# Patient Record
Sex: FEMALE | Race: BLACK OR AFRICAN AMERICAN | Employment: PART TIME | URBAN - METROPOLITAN AREA
[De-identification: names, ages, dates, MRNs, and addresses within clinical notes are randomized per-mention and may not be internally consistent; named-entity substitution may affect disease eponyms.]

---

## 2019-01-14 ENCOUNTER — OFFICE VISIT (OUTPATIENT)
Dept: FAMILY MEDICINE CLINIC | Facility: CLINIC | Age: 59
End: 2019-01-14
Payer: COMMERCIAL

## 2019-01-14 ENCOUNTER — TELEPHONE (OUTPATIENT)
Dept: FAMILY MEDICINE CLINIC | Facility: CLINIC | Age: 59
End: 2019-01-14

## 2019-01-14 VITALS
WEIGHT: 159 LBS | BODY MASS INDEX: 32.05 KG/M2 | TEMPERATURE: 98 F | DIASTOLIC BLOOD PRESSURE: 68 MMHG | OXYGEN SATURATION: 99 % | RESPIRATION RATE: 20 BRPM | HEART RATE: 68 BPM | HEIGHT: 59 IN | SYSTOLIC BLOOD PRESSURE: 108 MMHG

## 2019-01-14 DIAGNOSIS — J30.9 ALLERGIC RHINITIS, UNSPECIFIED SEASONALITY, UNSPECIFIED TRIGGER: ICD-10-CM

## 2019-01-14 DIAGNOSIS — R10.9 RIGHT FLANK PAIN: ICD-10-CM

## 2019-01-14 DIAGNOSIS — H10.13 ALLERGIC CONJUNCTIVITIS OF BOTH EYES: Primary | ICD-10-CM

## 2019-01-14 LAB
APPEARANCE: CLEAR
MULTISTIX LOT#: NORMAL NUMERIC
PH, URINE: 5.5 (ref 4.5–8)
SPECIFIC GRAVITY: 1.02 (ref 1–1.03)
URINE-COLOR: YELLOW
UROBILINOGEN,SEMI-QN: 0.2 MG/DL (ref 0–1.9)

## 2019-01-14 PROCEDURE — 99203 OFFICE O/P NEW LOW 30 MIN: CPT | Performed by: FAMILY MEDICINE

## 2019-01-14 PROCEDURE — 81003 URINALYSIS AUTO W/O SCOPE: CPT | Performed by: FAMILY MEDICINE

## 2019-01-14 PROCEDURE — 87086 URINE CULTURE/COLONY COUNT: CPT | Performed by: FAMILY MEDICINE

## 2019-01-14 RX ORDER — OLOPATADINE HYDROCHLORIDE 1 MG/ML
1 SOLUTION/ DROPS OPHTHALMIC 2 TIMES DAILY
Qty: 1 BOTTLE | Refills: 0 | Status: SHIPPED | OUTPATIENT
Start: 2019-01-14 | End: 2019-02-04

## 2019-01-14 RX ORDER — MULTIVITAMIN
1 TABLET ORAL DAILY
COMMUNITY
End: 2019-07-01

## 2019-01-14 NOTE — PROGRESS NOTES
HPI:    Patient ID: Andreas Montaño  is a 66year old female. Patient presents with:  Eye Problem  Flank Pain    HPI  Irritation of eyes. Both eyes. Look red. Itchy. No eye pain. Onset: 4 weeks  Wears glasses. No contacts.   Tx tried: visine - no reli normal. Pupils are equal, round, and reactive to light. Right eye exhibits no exudate. Left eye exhibits no exudate. Right conjunctiva is injected. Left conjunctiva is injected. Cardiovascular: Normal rate and regular rhythm. No murmur heard.   Pulmonar

## 2019-01-15 NOTE — TELEPHONE ENCOUNTER
lmtcb- which Spencer Lara should we send release form to?  [facility]  [address] [address] Internal medicine  [facility]

## 2019-02-04 DIAGNOSIS — H10.13 ALLERGIC CONJUNCTIVITIS OF BOTH EYES: ICD-10-CM

## 2019-02-04 RX ORDER — OLOPATADINE HYDROCHLORIDE 1 MG/ML
1 SOLUTION/ DROPS OPHTHALMIC 2 TIMES DAILY
Qty: 1 BOTTLE | Refills: 0 | Status: SHIPPED | OUTPATIENT
Start: 2019-02-04 | End: 2019-03-06

## 2019-02-04 NOTE — TELEPHONE ENCOUNTER
Fax failed. Called Dr. Leiva Fruit office at [phone] to obtain a different fax #. Request re-faxed to [phone].

## 2019-02-04 NOTE — TELEPHONE ENCOUNTER
Pt requesting Dr. Rudd Brightly sent another refill on her eye drops. Her eyes are not as red but still irritated. She thinks the drops are working but needs more. Rx pended.

## 2019-02-04 NOTE — TELEPHONE ENCOUNTER
Pt states the release should go to Dr. Antonio Peterson. Release faxed to them at [phone]. Requesting blood work per Dr. Maine Griffin note on 1/14/19.

## 2019-02-08 ENCOUNTER — TELEPHONE (OUTPATIENT)
Dept: FAMILY MEDICINE CLINIC | Facility: CLINIC | Age: 59
End: 2019-02-08

## 2019-02-08 NOTE — TELEPHONE ENCOUNTER
LMTCB  ----------------------------------------------------------------------  We sent a consent to obtain MR from [facility] at [facility]. RECORDS FROM [employer].     Response from them:  Lab work was ordered for LIPID P

## 2019-02-25 NOTE — TELEPHONE ENCOUNTER
Patient states she most likely did not have the labs done. Offered to schedule an appointment for labs here patient declined at this time.

## 2019-03-13 ENCOUNTER — TELEPHONE (OUTPATIENT)
Dept: FAMILY MEDICINE CLINIC | Facility: CLINIC | Age: 59
End: 2019-03-13

## 2019-03-25 ENCOUNTER — OFFICE VISIT (OUTPATIENT)
Dept: FAMILY MEDICINE CLINIC | Facility: CLINIC | Age: 59
End: 2019-03-25
Payer: COMMERCIAL

## 2019-03-25 ENCOUNTER — HOSPITAL ENCOUNTER (OUTPATIENT)
Dept: GENERAL RADIOLOGY | Age: 59
Discharge: HOME | End: 2019-03-25
Attending: FAMILY MEDICINE
Payer: COMMERCIAL

## 2019-03-25 VITALS
SYSTOLIC BLOOD PRESSURE: 114 MMHG | HEIGHT: 59 IN | OXYGEN SATURATION: 99 % | TEMPERATURE: 99 F | RESPIRATION RATE: 16 BRPM | DIASTOLIC BLOOD PRESSURE: 78 MMHG | BODY MASS INDEX: 31.85 KG/M2 | WEIGHT: 158 LBS | HEART RATE: 79 BPM

## 2019-03-25 DIAGNOSIS — M25.562 LEFT KNEE PAIN, UNSPECIFIED CHRONICITY: Primary | ICD-10-CM

## 2019-03-25 DIAGNOSIS — M25.562 LEFT KNEE PAIN, UNSPECIFIED CHRONICITY: ICD-10-CM

## 2019-03-25 PROCEDURE — 73560 X-RAY EXAM OF KNEE 1 OR 2: CPT | Performed by: FAMILY MEDICINE

## 2019-03-25 PROCEDURE — 99214 OFFICE O/P EST MOD 30 MIN: CPT | Performed by: FAMILY MEDICINE

## 2019-03-25 RX ORDER — NAPROXEN 500 MG/1
TABLET ORAL
Qty: 20 TABLET | Refills: 0 | Status: SHIPPED | OUTPATIENT
Start: 2019-03-25 | End: 2019-05-25

## 2019-03-25 NOTE — PROGRESS NOTES
HPI:    Patient ID: Nakul Vega is a 66year old female. Patient presents with:  Knee Pain: x 4 weeks. HPI  Left knee pain  Onset: mid-December. Worsened after going to gym past 6 weeks. Still able to walk on treadmill at gym. No weights.   Non-rad 114/78, pulse 79, temperature 98.6 °F (37 °C), temperature source Temporal, resp. rate 16, height 59\", weight 158 lb, SpO2 99 %.            ASSESSMENT/PLAN:   Left knee pain, unspecified chronicity  (primary encounter diagnosis)  RICE  Naproxen  Xray today

## 2019-05-25 ENCOUNTER — APPOINTMENT (OUTPATIENT)
Dept: GENERAL RADIOLOGY | Age: 59
End: 2019-05-25
Attending: EMERGENCY MEDICINE
Payer: MEDICAID

## 2019-05-25 ENCOUNTER — HOSPITAL ENCOUNTER (EMERGENCY)
Age: 59
Discharge: HOME | End: 2019-05-25
Attending: EMERGENCY MEDICINE
Payer: MEDICAID

## 2019-05-25 VITALS
RESPIRATION RATE: 16 BRPM | WEIGHT: 148 LBS | HEART RATE: 84 BPM | SYSTOLIC BLOOD PRESSURE: 149 MMHG | DIASTOLIC BLOOD PRESSURE: 93 MMHG | OXYGEN SATURATION: 99 % | HEIGHT: 59 IN | BODY MASS INDEX: 29.84 KG/M2 | TEMPERATURE: 98 F

## 2019-05-25 DIAGNOSIS — S83.92XA SPRAIN OF LEFT KNEE, UNSPECIFIED LIGAMENT, INITIAL ENCOUNTER: Primary | ICD-10-CM

## 2019-05-25 PROCEDURE — 99283 EMERGENCY DEPT VISIT LOW MDM: CPT

## 2019-05-25 PROCEDURE — 73560 X-RAY EXAM OF KNEE 1 OR 2: CPT | Performed by: EMERGENCY MEDICINE

## 2019-05-25 RX ORDER — IBUPROFEN 600 MG/1
600 TABLET ORAL ONCE
Status: COMPLETED | OUTPATIENT
Start: 2019-05-25 | End: 2019-05-25

## 2019-05-25 NOTE — ED INITIAL ASSESSMENT (HPI)
Left knee pain, pt denies any recent injury or trauma, stated that the knee pain has been going on for several months; has been using ice and heat with minimal relief; recent xray 6 weeks ago showed arthritis; pt finds it difficult to walk, +CMS intact, no

## 2019-05-25 NOTE — ED PROVIDER NOTES
Patient Seen in: [facility] [place]    History   Patient presents with:  Musculoskeletal Problem    Stated Complaint: left knee pain, denies injury    HPI    Patient is a 68-year-old female who states that for 3 to 4 months she has Sensation intact light touch. SKIN: No rash, good turgor. NEURO: Patient answers questions appropriately. No focal deficits appreciated.          ED Course   Labs Reviewed - No data to display       Left knee x-ray no fracture    MDM   Patient declined p

## 2019-05-29 ENCOUNTER — OFFICE VISIT (OUTPATIENT)
Dept: FAMILY MEDICINE CLINIC | Facility: CLINIC | Age: 59
End: 2019-05-29
Payer: MEDICAID

## 2019-05-29 VITALS
DIASTOLIC BLOOD PRESSURE: 78 MMHG | OXYGEN SATURATION: 98 % | BODY MASS INDEX: 31.43 KG/M2 | TEMPERATURE: 97 F | RESPIRATION RATE: 20 BRPM | HEART RATE: 61 BPM | HEIGHT: 59.5 IN | WEIGHT: 158 LBS | SYSTOLIC BLOOD PRESSURE: 132 MMHG

## 2019-05-29 DIAGNOSIS — M25.562 LEFT KNEE PAIN, UNSPECIFIED CHRONICITY: Primary | ICD-10-CM

## 2019-05-29 PROCEDURE — 99214 OFFICE O/P EST MOD 30 MIN: CPT | Performed by: FAMILY MEDICINE

## 2019-05-29 RX ORDER — IBUPROFEN 600 MG/1
600 TABLET ORAL DAILY PRN
Qty: 30 TABLET | Refills: 0 | Status: SHIPPED | OUTPATIENT
Start: 2019-05-29 | End: 2019-07-01

## 2019-05-29 NOTE — PROGRESS NOTES
Patient presents with:  Knee Pain: left knee pain       HPI:    Patient ID: Tia Hernandes is a 66year old female. Here for ER follow up. Was seen for flare of ongoing left knee pain on 5/25. No known injury. Knee started approx in 12/2018.  Init sta Constitutional: She appears well-nourished. No distress. Cardiovascular: Normal rate. Edema not present. Musculoskeletal:        Right knee: She exhibits normal range of motion, no swelling, no effusion and normal patellar mobility.  No tenderness

## 2019-06-13 ENCOUNTER — OFFICE VISIT (OUTPATIENT)
Dept: PHYSICAL THERAPY | Age: 59
End: 2019-06-13
Attending: FAMILY MEDICINE
Payer: MEDICAID

## 2019-06-13 DIAGNOSIS — M25.562 LEFT KNEE PAIN, UNSPECIFIED CHRONICITY: ICD-10-CM

## 2019-06-13 PROCEDURE — 97161 PT EVAL LOW COMPLEX 20 MIN: CPT

## 2019-06-13 PROCEDURE — 97110 THERAPEUTIC EXERCISES: CPT

## 2019-06-13 NOTE — PROGRESS NOTES
LOWER EXTREMITY EVALUATION:   Referring Physician: Dr. Esperanza Nugent  Diagnosis: Left knee pain, unspecified chronicity (M25.562)     Date of Service: 6/13/2019     PATIENT Jimmy Barrientos is a 66year old y/o female who presents to therapy today wit benefit from skilled physical therapy to address impairments and return to normal functional activities listed above.     Precautions:  None  OBJECTIVE:   Palpation: mod-severe TTP L medial joint line  Sensation: intact    AROM: (* denotes performed with pa decrease strain on the knee. · Pt will improve quad strength to 5/5 to ascend 1 flight of stairs reciprocally without UE assist   · Pt will increase hip and knee strength to grossly 4+/5 to be able to ambulate 3 miles at a time with minimal pain.   · Pt w

## 2019-06-18 ENCOUNTER — APPOINTMENT (OUTPATIENT)
Dept: PHYSICAL THERAPY | Age: 59
End: 2019-06-18
Attending: FAMILY MEDICINE
Payer: MEDICAID

## 2019-06-20 ENCOUNTER — OFFICE VISIT (OUTPATIENT)
Dept: PHYSICAL THERAPY | Age: 59
End: 2019-06-20
Attending: FAMILY MEDICINE
Payer: MEDICAID

## 2019-06-20 DIAGNOSIS — M25.562 LEFT KNEE PAIN, UNSPECIFIED CHRONICITY: ICD-10-CM

## 2019-06-20 PROCEDURE — 97110 THERAPEUTIC EXERCISES: CPT

## 2019-06-20 NOTE — PROGRESS NOTES
Dx: Left knee pain, unspecified chronicity (M25.562)             Insurance (Authorized # of Visits):  Medicaid ([employer] through 7/13/2019)           Authorizing Physician: Dr. Mary Beth Macias  Next MD visit: none scheduled  Fall Risk: standard         Precautions: n/a to perform stepping and squatting activities without excessive femoral IR/ADD   · Pt will improve SLS to 30s to improve safety with gait on uneven surfaces such as grass and gravel  · Pt will be independent and compliant with comprehensive HEP to maintain

## 2019-06-21 ENCOUNTER — OFFICE VISIT (OUTPATIENT)
Dept: PHYSICAL THERAPY | Age: 59
End: 2019-06-21
Attending: FAMILY MEDICINE
Payer: MEDICAID

## 2019-06-21 PROCEDURE — 97110 THERAPEUTIC EXERCISES: CPT

## 2019-06-21 NOTE — PROGRESS NOTES
Dx: Left knee pain, unspecified chronicity (M25.562)             Insurance (Authorized # of Visits):  Medicaid (8 Danielmatt Bernal through 7/13/2019)           Authorizing Physician: Dr. Melo Yeh  Next MD visit: none scheduled  Fall Risk: standard         Precautions: n/a safety with gait on uneven surfaces such as grass and gravel  · Pt will be independent and compliant with comprehensive HEP to maintain progress achieved in PT     Plan: Continue skilled PT for 2x/week for a total of 8 visits.  Progress strengthening Chemo Osorio

## 2019-06-28 ENCOUNTER — OFFICE VISIT (OUTPATIENT)
Dept: PHYSICAL THERAPY | Age: 59
End: 2019-06-28
Attending: FAMILY MEDICINE
Payer: MEDICAID

## 2019-06-28 PROCEDURE — 97110 THERAPEUTIC EXERCISES: CPT

## 2019-06-28 NOTE — PROGRESS NOTES
Dx: Left knee pain, unspecified chronicity (M25.562)             Insurance (Authorized # of Visits):  Medicaid (8 Maury Pin through 7/13/2019)           Authorizing Physician: Dr. Anil Mercado MD visit: none scheduled  Fall Risk: standard         Precautions: n/a ascend 1 flight of stairs reciprocally without UE assist   · Pt will increase hip and knee strength to grossly 4+/5 to be able to ambulate 3 miles at a time with minimal pain.   · Pt will demonstrate increased hip ER/ABD strength to 5/5 to perform stepping

## 2019-07-01 ENCOUNTER — OFFICE VISIT (OUTPATIENT)
Dept: FAMILY MEDICINE CLINIC | Facility: CLINIC | Age: 59
End: 2019-07-01
Payer: MEDICAID

## 2019-07-01 ENCOUNTER — APPOINTMENT (OUTPATIENT)
Dept: PHYSICAL THERAPY | Age: 59
End: 2019-07-01
Payer: MEDICAID

## 2019-07-01 VITALS
DIASTOLIC BLOOD PRESSURE: 70 MMHG | BODY MASS INDEX: 30.63 KG/M2 | RESPIRATION RATE: 16 BRPM | HEIGHT: 59.5 IN | TEMPERATURE: 98 F | SYSTOLIC BLOOD PRESSURE: 118 MMHG | WEIGHT: 154 LBS | OXYGEN SATURATION: 98 % | HEART RATE: 59 BPM

## 2019-07-01 DIAGNOSIS — M25.562 LEFT KNEE PAIN, UNSPECIFIED CHRONICITY: Primary | ICD-10-CM

## 2019-07-01 PROCEDURE — 99214 OFFICE O/P EST MOD 30 MIN: CPT | Performed by: FAMILY MEDICINE

## 2019-07-01 NOTE — PROGRESS NOTES
Patient presents with:  Knee Pain: f/u       HPI:    Patient ID: Chandu Wu is a 66year old female. Here for follow up on left knee pain. Ongoing for approx 6 months  Medial. Non-radiating.  Stiff in AM.  Completed PT, which helped with tightess bu motion, no swelling, no effusion, no ecchymosis and normal patellar mobility. Tenderness found. Medial joint line tenderness noted. Neurological: She has normal strength. No sensory deficit.  Gait normal.            ASSESSMENT/PLAN:   Left knee pain, unsp

## 2019-07-10 ENCOUNTER — HOSPITAL ENCOUNTER (OUTPATIENT)
Dept: MRI IMAGING | Age: 59
Discharge: HOME | End: 2019-07-10
Attending: FAMILY MEDICINE
Payer: MEDICAID

## 2019-07-10 DIAGNOSIS — M25.562 LEFT KNEE PAIN, UNSPECIFIED CHRONICITY: ICD-10-CM

## 2019-07-10 PROCEDURE — 73721 MRI JNT OF LWR EXTRE W/O DYE: CPT | Performed by: FAMILY MEDICINE

## 2019-07-22 ENCOUNTER — PATIENT OUTREACH (OUTPATIENT)
Dept: FAMILY MEDICINE CLINIC | Facility: CLINIC | Age: 59
End: 2019-07-22

## 2019-09-18 ENCOUNTER — TELEPHONE (OUTPATIENT)
Dept: FAMILY MEDICINE CLINIC | Facility: CLINIC | Age: 59
End: 2019-09-18

## 2019-09-18 DIAGNOSIS — Z12.39 BREAST CANCER SCREENING: Primary | ICD-10-CM

## 2019-09-18 NOTE — TELEPHONE ENCOUNTER
Patient has been seen by Dr Hank Perry in the past year. Order for 2D + 3D mammo placed. Patient advised. Verbalized understanding.

## 2019-09-30 ENCOUNTER — TELEPHONE (OUTPATIENT)
Dept: FAMILY MEDICINE CLINIC | Facility: CLINIC | Age: 59
End: 2019-09-30

## 2019-09-30 ENCOUNTER — OFFICE VISIT (OUTPATIENT)
Dept: FAMILY MEDICINE CLINIC | Facility: CLINIC | Age: 59
End: 2019-09-30
Payer: MEDICAID

## 2019-09-30 VITALS
BODY MASS INDEX: 30.64 KG/M2 | DIASTOLIC BLOOD PRESSURE: 68 MMHG | TEMPERATURE: 98 F | WEIGHT: 152 LBS | SYSTOLIC BLOOD PRESSURE: 104 MMHG | HEIGHT: 59 IN | RESPIRATION RATE: 18 BRPM | HEART RATE: 73 BPM

## 2019-09-30 DIAGNOSIS — Z00.00 ANNUAL PHYSICAL EXAM: Primary | ICD-10-CM

## 2019-09-30 PROCEDURE — 99396 PREV VISIT EST AGE 40-64: CPT | Performed by: FAMILY MEDICINE

## 2019-09-30 RX ORDER — TRIAMCINOLONE ACETONIDE 5 MG/G
OINTMENT TOPICAL
COMMUNITY
Start: 2018-08-03 | End: 2021-04-12

## 2019-09-30 NOTE — PROGRESS NOTES
HPI:   Bhupendra Szymanski is a 66year old female who presents for a complete physical exam.   No concerns today. Requesting vit B12 levels. Pap : s/p partial hysterectomy  Mammogram : has order  Last colonoscopy 4 years ago per pt.          Wt Readings f MUSCULOSKELETAL: denies back pain  NEURO: denies headaches or dizziness  PSYCHE: denies depression or anxiety    EXAM:   /68 (BP Location: Left arm, Patient Position: Sitting, Cuff Size: adult)   Pulse 73   Temp 97.6 °F (36.4 °C) (Temporal)   Resp

## 2019-09-30 NOTE — TELEPHONE ENCOUNTER
Call to patient and advised per DB that she can place order for Vitamin b12, insurance just may not pay for it. Advised patient to call her insurance to see if it will be covered. Patient asked that I call her back with the fee the office charges for this.

## 2019-09-30 NOTE — TELEPHONE ENCOUNTER
Vit B12 cost is about $98    If they have an insurance that the hospital has to bill, she or the patient could call the hospital billing department to see if they could give a cost.     Left detailed message. Ok per [employer] form.   Advised to call back with amanda

## 2019-11-19 ENCOUNTER — TELEPHONE (OUTPATIENT)
Dept: FAMILY MEDICINE CLINIC | Facility: CLINIC | Age: 59
End: 2019-11-19

## 2019-11-19 DIAGNOSIS — M25.562 LEFT KNEE PAIN, UNSPECIFIED CHRONICITY: Primary | ICD-10-CM

## 2019-11-19 NOTE — TELEPHONE ENCOUNTER
She will need to call insurance to see who is in network.  She can call us back with the name and we will place the referral.

## 2019-11-19 NOTE — TELEPHONE ENCOUNTER
Left detailed message. Ok per [employer] form. Advised note below.  Advised to call back with in network or with any questions

## 2019-11-19 NOTE — TELEPHONE ENCOUNTER
Pt called stated she is going to get a second opinion for her knee so she needs another name of an ortho doctor.

## 2019-12-02 ENCOUNTER — TELEPHONE (OUTPATIENT)
Dept: FAMILY MEDICINE CLINIC | Facility: CLINIC | Age: 59
End: 2019-12-02

## 2019-12-27 ENCOUNTER — TELEPHONE (OUTPATIENT)
Dept: FAMILY MEDICINE CLINIC | Facility: CLINIC | Age: 59
End: 2019-12-27

## 2019-12-27 NOTE — TELEPHONE ENCOUNTER
Patient states she has a new insurance. Does not have a card. States it is a [employer] HMO. Tax ID number given for office. She will call insurance to see if this office in network.

## 2020-02-03 ENCOUNTER — TELEPHONE (OUTPATIENT)
Dept: FAMILY MEDICINE CLINIC | Facility: CLINIC | Age: 60
End: 2020-02-03

## 2020-02-03 NOTE — TELEPHONE ENCOUNTER
Contacted patient regarding overdue labs. States she has a new insurance. Patient states she has [place]. She states she does not have the card yet. States she will contact insurance to see if we are in network and if she can have her labs done here.

## 2020-03-03 ENCOUNTER — TELEPHONE (OUTPATIENT)
Dept: FAMILY MEDICINE CLINIC | Facility: CLINIC | Age: 60
End: 2020-03-03

## 2020-06-10 ENCOUNTER — TELEPHONE (OUTPATIENT)
Dept: FAMILY MEDICINE CLINIC | Facility: CLINIC | Age: 60
End: 2020-06-10

## 2020-07-13 ENCOUNTER — TELEPHONE (OUTPATIENT)
Dept: FAMILY MEDICINE CLINIC | Facility: CLINIC | Age: 60
End: 2020-07-13

## 2020-10-19 ENCOUNTER — TELEPHONE (OUTPATIENT)
Dept: FAMILY MEDICINE CLINIC | Facility: CLINIC | Age: 60
End: 2020-10-19

## 2021-01-08 ENCOUNTER — OFFICE VISIT (OUTPATIENT)
Dept: INTERNAL MEDICINE CLINIC | Facility: CLINIC | Age: 61
End: 2021-01-08
Payer: COMMERCIAL

## 2021-01-08 VITALS
BODY MASS INDEX: 29.43 KG/M2 | SYSTOLIC BLOOD PRESSURE: 112 MMHG | HEART RATE: 78 BPM | WEIGHT: 146 LBS | RESPIRATION RATE: 16 BRPM | TEMPERATURE: 97 F | DIASTOLIC BLOOD PRESSURE: 68 MMHG | HEIGHT: 59 IN | OXYGEN SATURATION: 98 %

## 2021-01-08 DIAGNOSIS — Z00.00 ANNUAL PHYSICAL EXAM: Primary | ICD-10-CM

## 2021-01-08 DIAGNOSIS — Z00.00 LABORATORY EXAMINATION ORDERED AS PART OF A ROUTINE GENERAL MEDICAL EXAMINATION: ICD-10-CM

## 2021-01-08 DIAGNOSIS — Z13.89 SCREENING FOR GENITOURINARY CONDITION: ICD-10-CM

## 2021-01-08 DIAGNOSIS — Z01.84 IMMUNITY STATUS TESTING: ICD-10-CM

## 2021-01-08 DIAGNOSIS — IMO0001 PAP SMEAR OF CERVIX REMINDER NOT NEEDED FOREVER: ICD-10-CM

## 2021-01-08 DIAGNOSIS — Z12.31 ENCOUNTER FOR SCREENING MAMMOGRAM FOR MALIGNANT NEOPLASM OF BREAST: ICD-10-CM

## 2021-01-08 DIAGNOSIS — Z28.21 INFLUENZA VACCINATION DECLINED BY PATIENT: ICD-10-CM

## 2021-01-08 DIAGNOSIS — Z13.0 SCREENING, IRON DEFICIENCY ANEMIA: ICD-10-CM

## 2021-01-08 DIAGNOSIS — Z13.220 LIPID SCREENING: ICD-10-CM

## 2021-01-08 DIAGNOSIS — Z13.29 THYROID DISORDER SCREEN: ICD-10-CM

## 2021-01-08 PROBLEM — K51.90 ULCERATIVE COLITIS, CHRONIC (HCC): Status: RESOLVED | Noted: 2021-01-08 | Resolved: 2021-01-08

## 2021-01-08 PROBLEM — K51.90 ULCERATIVE COLITIS, CHRONIC (HCC): Status: ACTIVE | Noted: 2021-01-08

## 2021-01-08 PROCEDURE — 99386 PREV VISIT NEW AGE 40-64: CPT | Performed by: INTERNAL MEDICINE

## 2021-01-08 PROCEDURE — 3074F SYST BP LT 130 MM HG: CPT | Performed by: INTERNAL MEDICINE

## 2021-01-08 PROCEDURE — 3078F DIAST BP <80 MM HG: CPT | Performed by: INTERNAL MEDICINE

## 2021-01-08 PROCEDURE — 3008F BODY MASS INDEX DOCD: CPT | Performed by: INTERNAL MEDICINE

## 2021-01-08 RX ORDER — LORATADINE 10 MG/1
TABLET ORAL
COMMUNITY
Start: 2020-07-13

## 2021-01-08 RX ORDER — RIBOFLAVIN (VITAMIN B2) 100 MG
100 TABLET ORAL DAILY
COMMUNITY

## 2021-01-08 RX ORDER — FLUTICASONE PROPIONATE 50 MCG
SPRAY, SUSPENSION (ML) NASAL
COMMUNITY
Start: 2020-07-13 | End: 2021-02-08

## 2021-01-08 RX ORDER — MONTELUKAST SODIUM 10 MG/1
10 TABLET ORAL DAILY
COMMUNITY
Start: 2020-11-05 | End: 2021-02-08

## 2021-01-08 NOTE — PATIENT INSTRUCTIONS
Prevention Guidelines, Women Ages 48 to 59  Screening tests and vaccines are an important part of managing your health. A screening test is done to find possible disorders or diseases in people who don't have any symptoms.  The goal is to find a disease e Cervical cancer All women in this age group, except women who have had a complete hysterectomy Pap test every 3 years or Pap test with human papillomavirus (HPV) test every 5 years   Chlamydia Women who are sexually active and at increased risk for infecti , Eligibility criteria and age limit (possibly up to age [de-identified]) may vary across major organizations Yearly lung cancer screening with a low-dose CT scan (LDCT) Talk with your healthcare provider for more information.    Obesity All women in this age group At y PPSV23: 1 or 2 doses through age 64(protects against 23 types of pneumococcal bacteria)  Talk with your healthcare provider   Tetanus/diphtheria/pertussis (Td/Tdap) booster All women in this age group Td every 10 years, or a 1-time dose of Tdap instead of © 4216-8441 [address]. [address] [facility], [facility] [place]. All rights reserved. This information is not intended as a substitute for professional medical care. Always follow your healthcare professional's instructions.

## 2021-01-08 NOTE — PROGRESS NOTES
HPI:    Patient ID: Tia Hernandes is a 64year old female. New to me  HPI  HPI:   Tia Hernandes is a 64year old female who presents for a complete physical exam. Symptoms: is menopausal, is S/P TRACI, ovaries preserved.  Patient complains of nothing use: Yes      Frequency: Monthly or less      Comment: occ    Drug use: No    Occ: retired. Exercise: once per week,  twice per week.   Diet: watches fats closely and watches sugar closely     REVIEW OF SYSTEMS:   GENERAL: feels well otherwise  SKIN: kathy for: exercise, low fat diet    Body mass index is 29.49 kg/m². , recommended low fat diet and aerobic exercise 30 minutes three times weekly. The patient indicates understanding of these issues and agrees to the plan.   The patient is asked to return for

## 2021-01-11 ENCOUNTER — LABORATORY ENCOUNTER (OUTPATIENT)
Dept: LAB | Age: 61
End: 2021-01-11
Attending: INTERNAL MEDICINE
Payer: COMMERCIAL

## 2021-01-11 ENCOUNTER — HOSPITAL ENCOUNTER (OUTPATIENT)
Dept: MAMMOGRAPHY | Age: 61
Discharge: HOME | End: 2021-01-11
Attending: INTERNAL MEDICINE
Payer: COMMERCIAL

## 2021-01-11 DIAGNOSIS — Z13.0 SCREENING, IRON DEFICIENCY ANEMIA: ICD-10-CM

## 2021-01-11 DIAGNOSIS — Z12.31 ENCOUNTER FOR SCREENING MAMMOGRAM FOR MALIGNANT NEOPLASM OF BREAST: ICD-10-CM

## 2021-01-11 DIAGNOSIS — Z13.29 THYROID DISORDER SCREEN: ICD-10-CM

## 2021-01-11 DIAGNOSIS — Z13.220 LIPID SCREENING: ICD-10-CM

## 2021-01-11 DIAGNOSIS — Z00.00 LABORATORY EXAMINATION ORDERED AS PART OF A ROUTINE GENERAL MEDICAL EXAMINATION: ICD-10-CM

## 2021-01-11 DIAGNOSIS — Z13.89 SCREENING FOR GENITOURINARY CONDITION: ICD-10-CM

## 2021-01-11 DIAGNOSIS — Z01.84 IMMUNITY STATUS TESTING: ICD-10-CM

## 2021-01-11 LAB
ALBUMIN SERPL-MCNC: 4.2 G/DL (ref 3.4–5)
ALBUMIN/GLOB SERPL: 1.2 {RATIO} (ref 1–2)
ALP LIVER SERPL-CCNC: 65 U/L
ALT SERPL-CCNC: 24 U/L
ANION GAP SERPL CALC-SCNC: 6 MMOL/L (ref 0–18)
AST SERPL-CCNC: 22 U/L (ref 15–37)
BASOPHILS # BLD AUTO: 0.08 X10(3) UL (ref 0–0.2)
BASOPHILS NFR BLD AUTO: 0.9 %
BILIRUB SERPL-MCNC: 0.4 MG/DL (ref 0.1–2)
BILIRUB UR QL STRIP.AUTO: NEGATIVE
BUN BLD-MCNC: 13 MG/DL (ref 7–18)
BUN/CREAT SERPL: 13.7 (ref 10–20)
CALCIUM BLD-MCNC: 9.2 MG/DL (ref 8.5–10.1)
CHLORIDE SERPL-SCNC: 108 MMOL/L (ref 98–112)
CHOLEST SMN-MCNC: 255 MG/DL (ref ?–200)
CO2 SERPL-SCNC: 25 MMOL/L (ref 21–32)
COLOR UR AUTO: YELLOW
CREAT BLD-MCNC: 0.95 MG/DL
DEPRECATED RDW RBC AUTO: 49.6 FL (ref 35.1–46.3)
EOSINOPHIL # BLD AUTO: 0.19 X10(3) UL (ref 0–0.7)
EOSINOPHIL NFR BLD AUTO: 2.1 %
ERYTHROCYTE [DISTWIDTH] IN BLOOD BY AUTOMATED COUNT: 12.8 % (ref 11–15)
EST. AVERAGE GLUCOSE BLD GHB EST-MCNC: 111 MG/DL (ref 68–126)
GLOBULIN PLAS-MCNC: 3.6 G/DL (ref 2.8–4.4)
GLUCOSE BLD-MCNC: 79 MG/DL (ref 70–99)
GLUCOSE UR STRIP.AUTO-MCNC: NEGATIVE MG/DL
HBA1C MFR BLD HPLC: 5.5 % (ref ?–5.7)
HCT VFR BLD AUTO: 44.4 %
HCV AB SERPL QL IA: NONREACTIVE
HDLC SERPL-MCNC: 66 MG/DL (ref 40–59)
HGB BLD-MCNC: 14.1 G/DL
IMM GRANULOCYTES # BLD AUTO: 0.02 X10(3) UL (ref 0–1)
IMM GRANULOCYTES NFR BLD: 0.2 %
KETONES UR STRIP.AUTO-MCNC: NEGATIVE MG/DL
LDLC SERPL CALC-MCNC: 173 MG/DL (ref ?–100)
LYMPHOCYTES # BLD AUTO: 2.36 X10(3) UL (ref 1–4)
LYMPHOCYTES NFR BLD AUTO: 26.6 %
M PROTEIN MFR SERPL ELPH: 7.8 G/DL (ref 6.4–8.2)
MCH RBC QN AUTO: 33.2 PG (ref 26–34)
MCHC RBC AUTO-ENTMCNC: 31.8 G/DL (ref 31–37)
MCV RBC AUTO: 104.5 FL
MONOCYTES # BLD AUTO: 0.61 X10(3) UL (ref 0.1–1)
MONOCYTES NFR BLD AUTO: 6.9 %
NEUTROPHILS # BLD AUTO: 5.62 X10 (3) UL (ref 1.5–7.7)
NEUTROPHILS # BLD AUTO: 5.62 X10(3) UL (ref 1.5–7.7)
NEUTROPHILS NFR BLD AUTO: 63.3 %
NITRITE UR QL STRIP.AUTO: NEGATIVE
NONHDLC SERPL-MCNC: 189 MG/DL (ref ?–130)
OSMOLALITY SERPL CALC.SUM OF ELEC: 287 MOSM/KG (ref 275–295)
PATIENT FASTING Y/N/NP: YES
PATIENT FASTING Y/N/NP: YES
PH UR STRIP.AUTO: 6 [PH] (ref 4.5–8)
PLATELET # BLD AUTO: 312 10(3)UL (ref 150–450)
POTASSIUM SERPL-SCNC: 3.8 MMOL/L (ref 3.5–5.1)
PROT UR STRIP.AUTO-MCNC: NEGATIVE MG/DL
RBC # BLD AUTO: 4.25 X10(6)UL
RBC UR QL AUTO: NEGATIVE
SODIUM SERPL-SCNC: 139 MMOL/L (ref 136–145)
SP GR UR STRIP.AUTO: 1.01 (ref 1–1.03)
T4 FREE SERPL-MCNC: 0.6 NG/DL (ref 0.8–1.7)
TRIGL SERPL-MCNC: 81 MG/DL (ref 30–149)
TSI SER-ACNC: 5.68 MIU/ML (ref 0.36–3.74)
UROBILINOGEN UR STRIP.AUTO-MCNC: <2 MG/DL
VLDLC SERPL CALC-MCNC: 16 MG/DL (ref 0–30)
WBC # BLD AUTO: 8.9 X10(3) UL (ref 4–11)

## 2021-01-11 PROCEDURE — 36415 COLL VENOUS BLD VENIPUNCTURE: CPT

## 2021-01-11 PROCEDURE — 77063 BREAST TOMOSYNTHESIS BI: CPT | Performed by: INTERNAL MEDICINE

## 2021-01-11 PROCEDURE — 84443 ASSAY THYROID STIM HORMONE: CPT

## 2021-01-11 PROCEDURE — 80061 LIPID PANEL: CPT

## 2021-01-11 PROCEDURE — 77067 SCR MAMMO BI INCL CAD: CPT | Performed by: INTERNAL MEDICINE

## 2021-01-11 PROCEDURE — 83036 HEMOGLOBIN GLYCOSYLATED A1C: CPT

## 2021-01-11 PROCEDURE — 80053 COMPREHEN METABOLIC PANEL: CPT

## 2021-01-11 PROCEDURE — 81001 URINALYSIS AUTO W/SCOPE: CPT

## 2021-01-11 PROCEDURE — 85025 COMPLETE CBC W/AUTO DIFF WBC: CPT

## 2021-01-11 PROCEDURE — 84439 ASSAY OF FREE THYROXINE: CPT

## 2021-01-11 PROCEDURE — 86803 HEPATITIS C AB TEST: CPT

## 2021-01-12 ENCOUNTER — TELEPHONE (OUTPATIENT)
Dept: INTERNAL MEDICINE CLINIC | Facility: CLINIC | Age: 61
End: 2021-01-12

## 2021-01-12 DIAGNOSIS — E78.00 ELEVATED CHOLESTEROL: ICD-10-CM

## 2021-01-12 DIAGNOSIS — R92.8 ABNORMAL MAMMOGRAM: Primary | ICD-10-CM

## 2021-01-12 DIAGNOSIS — E03.9 HYPOTHYROIDISM, UNSPECIFIED TYPE: ICD-10-CM

## 2021-01-12 PROBLEM — Z78.9 HEPATITIS C ANTIBODY TEST NEGATIVE: Status: ACTIVE | Noted: 2021-01-12

## 2021-01-12 RX ORDER — LEVOTHYROXINE SODIUM 0.05 MG/1
50 TABLET ORAL
Qty: 90 TABLET | Refills: 0 | Status: SHIPPED | OUTPATIENT
Start: 2021-01-12 | End: 2021-04-08

## 2021-01-12 NOTE — TELEPHONE ENCOUNTER
----- Message from Rosaura Costello MD sent at 1/12/2021  8:46 AM CST -----  No dm2  Renal/lft are normal  Thyroid functions constitent with hypothyroidism.  Start levothyroxine 50 mcg daily and recheck tsh in 8 weeks  UA is bland  flp with with elevated t cho

## 2021-01-13 ENCOUNTER — TELEPHONE (OUTPATIENT)
Dept: INTERNAL MEDICINE CLINIC | Facility: CLINIC | Age: 61
End: 2021-01-13

## 2021-01-13 NOTE — TELEPHONE ENCOUNTER
Spoke with Cornelius Swift in mammogram dept. Pt does not need additional views at this time they are awaiting comparison films. ESTEFANY NAIROB.

## 2021-01-15 NOTE — TELEPHONE ENCOUNTER
D/w pt additional imaging not needed at this time. We will await updated result note from radiologist once prior films are received and compared. She expressed understanding.

## 2021-02-04 DIAGNOSIS — J30.2 SEASONAL ALLERGIES: Primary | ICD-10-CM

## 2021-02-08 RX ORDER — MONTELUKAST SODIUM 10 MG/1
10 TABLET ORAL DAILY
Qty: 30 TABLET | Refills: 3 | Status: SHIPPED | OUTPATIENT
Start: 2021-02-08 | End: 2021-10-06

## 2021-02-08 RX ORDER — FLUTICASONE PROPIONATE 50 MCG
SPRAY, SUSPENSION (ML) NASAL
Qty: 1 BOTTLE | Refills: 3 | Status: SHIPPED | OUTPATIENT
Start: 2021-02-08 | End: 2021-06-15

## 2021-02-08 NOTE — TELEPHONE ENCOUNTER
Last time medication was refilled 7/13/2020  Quantity and number of refills Historical.  Last OV 1/8/21  Next OV 1/22     Last time medication was refilled 11/5/2020  Quantity and number of refills Historical.  Last OV 1/8/21  Next OV 1/22

## 2021-02-08 NOTE — TELEPHONE ENCOUNTER
Pt concerned about a low temp reading taken upon entrance at her workplace, mid 96 to low 97 via infrared thermometer. Pt asymptomatic.  D/w pt that reading is not always accurate using infrared, pt's baseline for temp is in the mid to lower 97.4, pt not ex

## 2021-02-22 ENCOUNTER — HOSPITAL ENCOUNTER (OUTPATIENT)
Dept: ULTRASOUND IMAGING | Age: 61
Discharge: HOME | End: 2021-02-22
Attending: INTERNAL MEDICINE
Payer: COMMERCIAL

## 2021-02-22 ENCOUNTER — TELEPHONE (OUTPATIENT)
Dept: INTERNAL MEDICINE CLINIC | Facility: CLINIC | Age: 61
End: 2021-02-22

## 2021-02-22 ENCOUNTER — HOSPITAL ENCOUNTER (OUTPATIENT)
Dept: MAMMOGRAPHY | Age: 61
Discharge: HOME | End: 2021-02-22
Attending: INTERNAL MEDICINE
Payer: COMMERCIAL

## 2021-02-22 DIAGNOSIS — N64.89 BREAST ASYMMETRY: Primary | ICD-10-CM

## 2021-02-22 DIAGNOSIS — R92.2 INCONCLUSIVE MAMMOGRAM: ICD-10-CM

## 2021-02-22 PROCEDURE — 76641 ULTRASOUND BREAST COMPLETE: CPT | Performed by: INTERNAL MEDICINE

## 2021-02-22 PROCEDURE — 77065 DX MAMMO INCL CAD UNI: CPT | Performed by: INTERNAL MEDICINE

## 2021-02-22 PROCEDURE — 77061 BREAST TOMOSYNTHESIS UNI: CPT | Performed by: INTERNAL MEDICINE

## 2021-02-22 NOTE — TELEPHONE ENCOUNTER
----- Message from Elena Goff MD sent at 2/22/2021  2:02 PM CST -----  DIAGNOSTIC CATEGORY 3--PROBABLY BENIGN FINDING.         RECOMMENDATIONS:     SHORT TERM FOLLOW-UP DIAGNOSTIC MAMMOGRAM LEFT BREAST IN 6 MONTHS

## 2021-03-08 ENCOUNTER — OFFICE VISIT (OUTPATIENT)
Dept: INTERNAL MEDICINE CLINIC | Facility: CLINIC | Age: 61
End: 2021-03-08
Payer: COMMERCIAL

## 2021-03-08 ENCOUNTER — TELEPHONE (OUTPATIENT)
Dept: INTERNAL MEDICINE CLINIC | Facility: CLINIC | Age: 61
End: 2021-03-08

## 2021-03-08 VITALS
RESPIRATION RATE: 16 BRPM | TEMPERATURE: 99 F | DIASTOLIC BLOOD PRESSURE: 68 MMHG | BODY MASS INDEX: 29.64 KG/M2 | OXYGEN SATURATION: 99 % | WEIGHT: 147 LBS | HEIGHT: 59 IN | HEART RATE: 70 BPM | SYSTOLIC BLOOD PRESSURE: 124 MMHG

## 2021-03-08 DIAGNOSIS — E78.2 MIXED HYPERLIPIDEMIA: ICD-10-CM

## 2021-03-08 DIAGNOSIS — Z12.11 COLON CANCER SCREENING: Primary | ICD-10-CM

## 2021-03-08 DIAGNOSIS — E03.9 HYPOTHYROIDISM, ACQUIRED: Primary | ICD-10-CM

## 2021-03-08 DIAGNOSIS — Z12.11 COLON CANCER SCREENING: ICD-10-CM

## 2021-03-08 DIAGNOSIS — Z12.4 SCREENING FOR CERVICAL CANCER: ICD-10-CM

## 2021-03-08 PROCEDURE — 3008F BODY MASS INDEX DOCD: CPT | Performed by: NURSE PRACTITIONER

## 2021-03-08 PROCEDURE — 88175 CYTOPATH C/V AUTO FLUID REDO: CPT | Performed by: NURSE PRACTITIONER

## 2021-03-08 PROCEDURE — 99214 OFFICE O/P EST MOD 30 MIN: CPT | Performed by: NURSE PRACTITIONER

## 2021-03-08 PROCEDURE — 3074F SYST BP LT 130 MM HG: CPT | Performed by: NURSE PRACTITIONER

## 2021-03-08 PROCEDURE — 87624 HPV HI-RISK TYP POOLED RSLT: CPT | Performed by: NURSE PRACTITIONER

## 2021-03-08 PROCEDURE — 3078F DIAST BP <80 MM HG: CPT | Performed by: NURSE PRACTITIONER

## 2021-03-08 NOTE — PROGRESS NOTES
HPI:    Patient ID: Jenn Rivera is a 64year old female. Patient presents with:  Thyroid Problem  Gynecologic Exam      Patient has started levothyroxine as directed.  She reports having days of feeling off but this maybe related to stress of her m Expenses:   Food Insecurity:       Worried About [address] in the Last Year:       Ran Out of Food in the Last Year:   Transportation Needs:       Lack of Transportation (Medical):       Lack of Transportation (Non-Medical):   Physical Activity: 3.6 01/11/2021     01/11/2021    K 3.8 01/11/2021     01/11/2021    CO2 25.0 01/11/2021     Lab Results   Component Value Date    WBC 8.9 01/11/2021    RBC 4.25 01/11/2021    HGB 14.1 01/11/2021    HCT 44.4 01/11/2021    .5 (H) 01/11/202 1 month    Screening for cervical cancer  -     THINPREP PAP SMEAR B; Future  -     HPV HIGH RISK , THIN PREP COLLECTION;  Future    Colon cancer screening  -     GASTRO - INTERNAL    Mixed hyperlipidemia- recheck fasting labs in 1 month, continue diet/exer

## 2021-03-08 NOTE — PATIENT INSTRUCTIONS
Complete blood work in April, we will call you with results  Continue low fat diet with exercise  Complete colonoscopy  Follow up Jan 2022

## 2021-03-08 NOTE — TELEPHONE ENCOUNTER
Patient was seen today, and referred to Dr Lisbeth Dinero for a colonoscopy; however, she was informed he is not in network, and will need a GI in network.  Patient has Yuliana Correia, and Dr Terry Butler at [facility] is in network, and patient was advised she may h

## 2021-03-10 LAB — HPV I/H RISK 1 DNA SPEC QL NAA+PROBE: NEGATIVE

## 2021-03-12 ENCOUNTER — TELEPHONE (OUTPATIENT)
Dept: INTERNAL MEDICINE CLINIC | Facility: CLINIC | Age: 61
End: 2021-03-12

## 2021-03-12 NOTE — TELEPHONE ENCOUNTER
Pt c/o sinusitis symptoms including nasal congestion with a burning sensation, a \"pinching\" sensation around temple area, denies HA, no fever, cough or SOB. Is having some chest pain upon inhalation that is mild, only on L side by breast area.  Pt admitte

## 2021-03-12 NOTE — TELEPHONE ENCOUNTER
Patient called in stating that she thinks she may have a sinus infection. Patient also stated she feels \"her head burning up and chest discomfort /pressure on left side\". Please advise.

## 2021-03-15 ENCOUNTER — TELEMEDICINE (OUTPATIENT)
Dept: INTERNAL MEDICINE CLINIC | Facility: CLINIC | Age: 61
End: 2021-03-15

## 2021-03-15 DIAGNOSIS — B96.89 ACUTE BACTERIAL RHINOSINUSITIS: Primary | ICD-10-CM

## 2021-03-15 DIAGNOSIS — J01.90 ACUTE BACTERIAL RHINOSINUSITIS: Primary | ICD-10-CM

## 2021-03-15 PROCEDURE — 99213 OFFICE O/P EST LOW 20 MIN: CPT | Performed by: NURSE PRACTITIONER

## 2021-03-15 RX ORDER — AZITHROMYCIN 250 MG/1
TABLET, FILM COATED ORAL
Qty: 6 TABLET | Refills: 0 | Status: SHIPPED | OUTPATIENT
Start: 2021-03-15 | End: 2021-03-20

## 2021-03-15 NOTE — PROGRESS NOTES
HPI:   This visit is conducted using Telemedicine with live, interactive video and audio.    symptoms of sinus congestion, sinus pressure, ear pressure for 6 days.    Current treatment includes flonase which has improved symptoms mildly    Current Outpatien Signed Prescriptions Disp Refills   • azithromycin (ZITHROMAX Z-ANGELITO) 250 MG Oral Tab 6 tablet 0     Sig: Take 2 tablets (500 mg total) by mouth daily for 1 day, THEN 1 tablet (250 mg total) daily for 4 days.        Instructions given on increasing fluid

## 2021-04-08 DIAGNOSIS — E03.9 HYPOTHYROIDISM, UNSPECIFIED TYPE: ICD-10-CM

## 2021-04-08 RX ORDER — LEVOTHYROXINE SODIUM 0.05 MG/1
50 TABLET ORAL
Qty: 30 TABLET | Refills: 0 | Status: SHIPPED | OUTPATIENT
Start: 2021-04-08 | End: 2021-04-12

## 2021-04-08 NOTE — TELEPHONE ENCOUNTER
Fax request from [facility] in [place], [place] for a refill on   Levothyroxine Sodium 50 MCG Oral Tab 90 tablet

## 2021-04-12 ENCOUNTER — LAB ENCOUNTER (OUTPATIENT)
Dept: LAB | Age: 61
End: 2021-04-12
Attending: INTERNAL MEDICINE
Payer: COMMERCIAL

## 2021-04-12 ENCOUNTER — TELEPHONE (OUTPATIENT)
Dept: INTERNAL MEDICINE CLINIC | Facility: CLINIC | Age: 61
End: 2021-04-12

## 2021-04-12 ENCOUNTER — OFFICE VISIT (OUTPATIENT)
Dept: INTERNAL MEDICINE CLINIC | Facility: CLINIC | Age: 61
End: 2021-04-12
Payer: COMMERCIAL

## 2021-04-12 VITALS
BODY MASS INDEX: 29.84 KG/M2 | OXYGEN SATURATION: 96 % | DIASTOLIC BLOOD PRESSURE: 66 MMHG | RESPIRATION RATE: 16 BRPM | TEMPERATURE: 99 F | WEIGHT: 148 LBS | HEART RATE: 74 BPM | HEIGHT: 59 IN | SYSTOLIC BLOOD PRESSURE: 122 MMHG

## 2021-04-12 DIAGNOSIS — G89.29 CHRONIC PAIN OF RIGHT KNEE: ICD-10-CM

## 2021-04-12 DIAGNOSIS — E03.9 HYPOTHYROIDISM, UNSPECIFIED TYPE: ICD-10-CM

## 2021-04-12 DIAGNOSIS — E78.00 ELEVATED CHOLESTEROL: ICD-10-CM

## 2021-04-12 DIAGNOSIS — E03.9 HYPOTHYROIDISM, ACQUIRED: ICD-10-CM

## 2021-04-12 DIAGNOSIS — M25.561 CHRONIC PAIN OF RIGHT KNEE: ICD-10-CM

## 2021-04-12 DIAGNOSIS — E78.2 MIXED HYPERLIPIDEMIA: Primary | ICD-10-CM

## 2021-04-12 DIAGNOSIS — Z12.11 COLON CANCER SCREENING: ICD-10-CM

## 2021-04-12 PROBLEM — Z91.89 10 YEAR RISK OF MI OR STROKE < 7.5%: Status: ACTIVE | Noted: 2021-04-12

## 2021-04-12 PROCEDURE — 84443 ASSAY THYROID STIM HORMONE: CPT | Performed by: INTERNAL MEDICINE

## 2021-04-12 PROCEDURE — 36415 COLL VENOUS BLD VENIPUNCTURE: CPT | Performed by: INTERNAL MEDICINE

## 2021-04-12 PROCEDURE — 99214 OFFICE O/P EST MOD 30 MIN: CPT | Performed by: INTERNAL MEDICINE

## 2021-04-12 PROCEDURE — 3074F SYST BP LT 130 MM HG: CPT | Performed by: INTERNAL MEDICINE

## 2021-04-12 PROCEDURE — 3078F DIAST BP <80 MM HG: CPT | Performed by: INTERNAL MEDICINE

## 2021-04-12 PROCEDURE — 3008F BODY MASS INDEX DOCD: CPT | Performed by: INTERNAL MEDICINE

## 2021-04-12 RX ORDER — LEVOTHYROXINE SODIUM 0.05 MG/1
50 TABLET ORAL
Qty: 90 TABLET | Refills: 0 | Status: SHIPPED | OUTPATIENT
Start: 2021-04-12 | End: 2021-08-02

## 2021-04-12 NOTE — PATIENT INSTRUCTIONS
Understanding Food and Cholesterol  Having a high cholesterol level puts you at risk for heart disease and other health problems. What you eat has a big effect on your body’s cholesterol level. Eating certain foods can raise your cholesterol.  Other foods instructions.

## 2021-04-12 NOTE — PROGRESS NOTES
HPI/Subjective:   Patient ID: Javier Sheikh is a 64year old female.   The 10-year ASCVD risk score (Efrem Del Castillo, et al., 2013) is: 4.1%    Values used to calculate the score:      Age: 64 years      Sex: Female      Is Non- : [place] Suspension Shake and use 1 spray per nostril daily. 1 Bottle 3   • loratadine 10 MG Oral Tab TAKE 1 T PO QD     • Multiple Vitamins-Minerals (MULTIVITAMIN ADULT OR) multivitamin     • Ascorbic Acid (VITAMIN C) 100 MG Oral Tab Take 100 mg by mouth daily. and flp  suspect at time flp was checked last visit her tsh was in hypo range which could be contributing to elevated numbers.  Nonetheless she will not need a statin as her 10 year cad risk Is less than 7.5%  Check xray right knee for eval of right knee pa

## 2021-04-28 ENCOUNTER — OFFICE VISIT (OUTPATIENT)
Dept: SURGERY | Facility: CLINIC | Age: 61
End: 2021-04-28
Payer: COMMERCIAL

## 2021-04-28 VITALS — HEART RATE: 53 BPM | DIASTOLIC BLOOD PRESSURE: 75 MMHG | TEMPERATURE: 97 F | SYSTOLIC BLOOD PRESSURE: 128 MMHG

## 2021-04-28 DIAGNOSIS — Z12.11 COLON CANCER SCREENING: Primary | ICD-10-CM

## 2021-04-28 PROCEDURE — S0285 CNSLT BEFORE SCREEN COLONOSC: HCPCS | Performed by: SURGERY

## 2021-04-28 PROCEDURE — 3078F DIAST BP <80 MM HG: CPT | Performed by: SURGERY

## 2021-04-28 PROCEDURE — 3074F SYST BP LT 130 MM HG: CPT | Performed by: SURGERY

## 2021-04-28 RX ORDER — SODIUM, POTASSIUM,MAG SULFATES 17.5-3.13G
SOLUTION, RECONSTITUTED, ORAL ORAL
Qty: 1 KIT | Refills: 0 | Status: SHIPPED | OUTPATIENT
Start: 2021-04-28 | End: 2021-07-15 | Stop reason: ALTCHOICE

## 2021-04-28 RX ORDER — SODIUM CHLORIDE, SODIUM LACTATE, POTASSIUM CHLORIDE, CALCIUM CHLORIDE 600; 310; 30; 20 MG/100ML; MG/100ML; MG/100ML; MG/100ML
INJECTION, SOLUTION INTRAVENOUS CONTINUOUS
Status: CANCELLED | OUTPATIENT
Start: 2021-04-28

## 2021-04-28 NOTE — H&P
New Patient Visit Note       Active Problems      No diagnosis found. Chief Complaint   Patient presents with:  Colonoscopy: NP - cscope consult - Denies GI issues at this time. Denies family hx colon or rectal CA.        History of Present Illness   Car Nasal Suspension, Shake and use 1 spray per nostril daily. loratadine 10 MG Oral Tab, TAKE 1 T PO QD  Multiple Vitamins-Minerals (MULTIVITAMIN ADULT OR), multivitamin  Ascorbic Acid (VITAMIN C) 100 MG Oral Tab, Take 100 mg by mouth daily.     No current fa abdominal tenderness. There is no rebound. Skin:     General: Skin is warm and dry. Neurological:      Mental Status: She is alert and oriented to person, place, and time.    Psychiatric:         Behavior: Behavior normal.             Assessment   Scree

## 2021-04-30 ENCOUNTER — TELEPHONE (OUTPATIENT)
Dept: INTERNAL MEDICINE CLINIC | Facility: CLINIC | Age: 61
End: 2021-04-30

## 2021-04-30 RX ORDER — OLOPATADINE HYDROCHLORIDE 1 MG/ML
1 SOLUTION/ DROPS OPHTHALMIC 2 TIMES DAILY
Qty: 5 ML | Refills: 0 | Status: SHIPPED | OUTPATIENT
Start: 2021-04-30 | End: 2022-01-21

## 2021-04-30 NOTE — TELEPHONE ENCOUNTER
Spoke with pt her right eye has been feeling irritated, itching, some redness and in the AM does have accumulation or drainage in corner. She has been using Visine for red eyes without relief. Please send [employer] message with recommendations.      Per

## 2021-05-08 ENCOUNTER — LAB ENCOUNTER (OUTPATIENT)
Dept: LAB | Facility: HOSPITAL | Age: 61
End: 2021-05-08
Attending: SURGERY
Payer: COMMERCIAL

## 2021-05-08 DIAGNOSIS — Z12.11 COLON CANCER SCREENING: ICD-10-CM

## 2021-05-08 DIAGNOSIS — E03.9 HYPOTHYROIDISM, UNSPECIFIED TYPE: ICD-10-CM

## 2021-05-08 DIAGNOSIS — E78.00 ELEVATED CHOLESTEROL: ICD-10-CM

## 2021-05-08 DIAGNOSIS — E03.9 HYPOTHYROIDISM, ACQUIRED: ICD-10-CM

## 2021-05-08 PROCEDURE — 36415 COLL VENOUS BLD VENIPUNCTURE: CPT

## 2021-05-08 PROCEDURE — 80061 LIPID PANEL: CPT

## 2021-05-10 ENCOUNTER — ANESTHESIA EVENT (OUTPATIENT)
Dept: ENDOSCOPY | Facility: HOSPITAL | Age: 61
End: 2021-05-10
Payer: COMMERCIAL

## 2021-05-10 ENCOUNTER — ANESTHESIA (OUTPATIENT)
Dept: ENDOSCOPY | Facility: HOSPITAL | Age: 61
End: 2021-05-10
Payer: COMMERCIAL

## 2021-05-10 ENCOUNTER — HOSPITAL ENCOUNTER (OUTPATIENT)
Facility: HOSPITAL | Age: 61
Setting detail: HOSPITAL OUTPATIENT SURGERY
Discharge: HOME | End: 2021-05-10
Attending: SURGERY | Admitting: SURGERY
Payer: COMMERCIAL

## 2021-05-10 VITALS
SYSTOLIC BLOOD PRESSURE: 169 MMHG | WEIGHT: 148 LBS | RESPIRATION RATE: 18 BRPM | DIASTOLIC BLOOD PRESSURE: 74 MMHG | HEART RATE: 55 BPM | TEMPERATURE: 98 F | HEIGHT: 59 IN | OXYGEN SATURATION: 98 % | BODY MASS INDEX: 29.84 KG/M2

## 2021-05-10 DIAGNOSIS — Z12.11 COLON CANCER SCREENING: Primary | ICD-10-CM

## 2021-05-10 PROCEDURE — 0DBP8ZX EXCISION OF RECTUM, VIA NATURAL OR ARTIFICIAL OPENING ENDOSCOPIC, DIAGNOSTIC: ICD-10-PCS | Performed by: SURGERY

## 2021-05-10 PROCEDURE — 0DBK8ZX EXCISION OF ASCENDING COLON, VIA NATURAL OR ARTIFICIAL OPENING ENDOSCOPIC, DIAGNOSTIC: ICD-10-PCS | Performed by: SURGERY

## 2021-05-10 PROCEDURE — 88305 TISSUE EXAM BY PATHOLOGIST: CPT | Performed by: SURGERY

## 2021-05-10 PROCEDURE — 0DBM8ZX EXCISION OF DESCENDING COLON, VIA NATURAL OR ARTIFICIAL OPENING ENDOSCOPIC, DIAGNOSTIC: ICD-10-PCS | Performed by: SURGERY

## 2021-05-10 PROCEDURE — 0DBN8ZX EXCISION OF SIGMOID COLON, VIA NATURAL OR ARTIFICIAL OPENING ENDOSCOPIC, DIAGNOSTIC: ICD-10-PCS | Performed by: SURGERY

## 2021-05-10 RX ORDER — HYDROMORPHONE HYDROCHLORIDE 1 MG/ML
0.4 INJECTION, SOLUTION INTRAMUSCULAR; INTRAVENOUS; SUBCUTANEOUS EVERY 5 MIN PRN
Status: DISCONTINUED | OUTPATIENT
Start: 2021-05-10 | End: 2021-05-10

## 2021-05-10 RX ORDER — SODIUM CHLORIDE, SODIUM LACTATE, POTASSIUM CHLORIDE, CALCIUM CHLORIDE 600; 310; 30; 20 MG/100ML; MG/100ML; MG/100ML; MG/100ML
INJECTION, SOLUTION INTRAVENOUS CONTINUOUS
Status: DISCONTINUED | OUTPATIENT
Start: 2021-05-10 | End: 2021-05-10

## 2021-05-10 RX ORDER — NALOXONE HYDROCHLORIDE 0.4 MG/ML
80 INJECTION, SOLUTION INTRAMUSCULAR; INTRAVENOUS; SUBCUTANEOUS AS NEEDED
Status: DISCONTINUED | OUTPATIENT
Start: 2021-05-10 | End: 2021-05-10

## 2021-05-10 RX ORDER — LIDOCAINE HYDROCHLORIDE 10 MG/ML
INJECTION, SOLUTION EPIDURAL; INFILTRATION; INTRACAUDAL; PERINEURAL AS NEEDED
Status: DISCONTINUED | OUTPATIENT
Start: 2021-05-10 | End: 2021-05-10 | Stop reason: SURG

## 2021-05-10 RX ADMIN — LIDOCAINE HYDROCHLORIDE 50 MG: 10 INJECTION, SOLUTION EPIDURAL; INFILTRATION; INTRACAUDAL; PERINEURAL at 15:39:00

## 2021-05-10 NOTE — ANESTHESIA POSTPROCEDURE EVALUATION
[address] [facility] Patient Status:  Hospital Outpatient Surgery   Age/Gender 64year old female MRN [id number]   Location [address] [address] Attending Celeste Mota, [facility] Day # 0 PCP MD Saranya Sosa

## 2021-05-10 NOTE — H&P
History of Present Illness   Felix Kenny is a 64year old female referred by Dr. Bakari Jackson for evaluation of colonoscopy.     The patient states that her last colonoscopy was approximately 15 years ago.   She reports a history of colon polyps.    (VITAMIN C) 100 MG Oral Tab, Take 100 mg by mouth daily.     No current facility-administered medications on file prior to visit. Review of Systems  The Review of Systems has been reviewed by me during today.   Review of Systems   Constitutional: Neg Psychiatric:         Behavior: Behavior normal.            Assessment   Screening colonoscopy and patient with history of colon polyps.     Plan   Colonoscopy discussed with patient risk of bleeding and bowel perforation with surgery to repair  No orders

## 2021-05-10 NOTE — ANESTHESIA PREPROCEDURE EVALUATION
PRE-OP EVALUATION    Patient Name: Duc Coreas    Admit Diagnosis: Colon cancer screening [Z12.11]    Pre-op Diagnosis: Colon cancer screening [Z12.11]    COLONOSCOPY    Anesthesia Procedure: COLONOSCOPY (N/A )    Surgeon(s) and Role:     * Chris Fermin hypothyroidism                       Pulmonary    Negative pulmonary ROS. Neuro/Psych    Negative neuro/psych ROS.                                 Past Surgical History:   Procedure Laterality Date   •   1987   • HYSTERE

## 2021-05-11 NOTE — OPERATIVE REPORT
[facility]    PATIENT'S NAME: Danny Cain   ATTENDING PHYSICIAN: Stephania Solares D.O.   OPERATING PHYSICIAN: Stephania Solares D.O.   PATIENT ACCOUNT#:   [de-identified]    LOCATION:  [address] 13 ED [address] #:   [id number] withdrawn. Patient tolerated the procedure well. On further discussion with the patient after the procedure, she states she does have a history of colitis and was previously on Asacol and no longer takes this medication.   The patient was referred to

## 2021-06-15 ENCOUNTER — TELEPHONE (OUTPATIENT)
Dept: INTERNAL MEDICINE CLINIC | Facility: CLINIC | Age: 61
End: 2021-06-15

## 2021-06-15 DIAGNOSIS — B96.89 ACUTE BACTERIAL RHINOSINUSITIS: ICD-10-CM

## 2021-06-15 DIAGNOSIS — J30.2 SEASONAL ALLERGIES: ICD-10-CM

## 2021-06-15 DIAGNOSIS — J30.9 ALLERGIC RHINITIS, UNSPECIFIED SEASONALITY, UNSPECIFIED TRIGGER: Primary | ICD-10-CM

## 2021-06-15 DIAGNOSIS — J01.90 ACUTE BACTERIAL RHINOSINUSITIS: ICD-10-CM

## 2021-06-15 RX ORDER — AZITHROMYCIN 250 MG/1
TABLET, FILM COATED ORAL
Qty: 6 TABLET | Refills: 0 | OUTPATIENT
Start: 2021-06-15

## 2021-06-15 RX ORDER — MOMETASONE 50 UG/1
2 SPRAY, METERED NASAL DAILY
Qty: 17 G | Refills: 1 | Status: SHIPPED | OUTPATIENT
Start: 2021-06-15 | End: 2021-06-16

## 2021-06-15 NOTE — TELEPHONE ENCOUNTER
Ok per Dr. Montalvo Memory to change to generic nasonex 2 sprays in each nostril once daily. D/w pt change she expressed understanding and agrees with change. Rx sent.

## 2021-06-15 NOTE — TELEPHONE ENCOUNTER
Fax request from [facility] in [place], [place] for a drug Change request- Plan does not cover   Fluticasone Propionate 50 MCG/ACT Nasal Suspension 1 Bottle     Instead, asking for an alternative- mometasone SPR 50 MCG.  Please inform strength, directions, dora

## 2021-06-15 NOTE — TELEPHONE ENCOUNTER
Fax request from Liane for a PA on Mometasone 50MCG Nasal [place] [address] . Patient: Marni Ibrahim  Age: 24 year old  Sex: female  MRN: [id number]  Encounter Date: 8/11/2018     [place] EMERGENCY DEPARTMENT ENCOUNTER   [facility] GIAERON  [facility] [place] [facility]  [address] [place] [address]  [phone]      History     Chief Complaint   Patient presents with   • Back Pain       HPI  This is a 24 year old female, history of seizures, migraines, who presented to the ED with low back pain. Patient states that she has 8/10 low back pain which initially started on Wednesday. She describes it as squeezing and constant, worse when she is moving, with no radiation. She reports that her father is in a wheelchair and she felt pain in her lower back when lifting him. She was seen yesterday in this emergency department and diagnosed with a lower back strain. She has been using anti-inflammatories but states that her pain is worse this morning. Patient has not lifted anything heavy since her injury. Patient denies cauda equina symptoms, hematuria, flank pain, nausea/vomiting, chest pain, shortness of breath, no other complaints at this time.  She is currently on her menstrual cycle.  No previous back surgery.    Allergies  ALLERGIES:   Allergen Reactions   • Reglan [Metoclopramide] Other (See Comments)     lowers the seizure threshold and is relatively contraindicated in someone with a seizure disorder   • Sulindac DIZZINESS       Current Medications  Prior to Admission Medications    AMITRIPTYLINE (ELAVIL) 25 MG TABLET    Take 0.5 tablets by mouth nightly. Indications: Pain    FLUTICASONE (FLONASE) 50 MCG/ACT NASAL SPRAY    Spray 1 spray in each nostril daily.    NAPROXEN (NAPROSYN) 500 MG TABLET    Take 1 tablet by mouth 2 times daily (with meals).    NORETHINDRONE-ETHINYL ESTRADIOL (NECON 0.5/35, 28,) 0.5-35 MG-MCG PER TABLET    Take 1 tablet by mouth daily. Take at same time daily Startinb with your next period    ONDANSETRON (ZOFRAN ODT) 4 MG DISINTEGRATING TABLET    Place 1  tablet onto the tongue every 8 hours as needed for Nausea.       Past Medical History  Past Medical History:   Diagnosis Date   • Gastroesophageal reflux    • Headache(784.0)    • Migraine    • Seizures (CMS/HCC)        Surgical History  Past Surgical History:   Procedure Laterality Date   • Esophagogastroduodenoscopy  11/27/15   • Esophagogastroduodenoscopy  05/30/2017       Social History  Social History     Social History   • Marital status: Single     Spouse name: N/A   • Number of children: N/A   • Years of education: N/A     Social History Main Topics   • Smoking status: Never Smoker   • Smokeless tobacco: Never Used   • Alcohol use 1.8 - 2.4 oz/week     3 - 4 Standard drinks or equivalent per week   • Drug use: No   • Sexual activity: Yes     Partners: Male     Birth control/ protection: Condom     Other Topics Concern   • None     Social History Narrative   • None        Family History  No family history on file.    Review of Systems  All other systems reviewed and otherwise negative or noncontributory except as documented in the HPI (History of Present Illness).    Physical Exam     ED Triage Vitals [08/11/18 0935]   ED Triage Vitals Group      Temp 98.5 °F (36.9 °C)      Pulse 89      Resp 18      BP (!) 134/105      SpO2 99 %      EtCO2 mmHg       Height 5' 3\" (1.6 m)      Weight 120 lb (54.4 kg)      Weight Scale Used      Physical Exam   Constitutional: She is oriented to person, place, and time. She appears well-developed and well-nourished. No distress.   HENT:   Head: Normocephalic and atraumatic.   Right Ear: External ear normal.   Left Ear: External ear normal.   Nose: Nose normal.   Mouth/Throat: Oropharynx is clear and moist. No oropharyngeal exudate.   Eyes: Pupils are equal, round, and reactive to light. Conjunctivae and EOM are normal.   Neck: Normal range of motion. Neck supple.   Cardiovascular: Normal rate, regular rhythm and intact distal pulses.    Pulmonary/Chest: Effort normal and  breath sounds normal. No respiratory distress. She has no wheezes.   Abdominal: Soft. Bowel sounds are normal. She exhibits no distension. There is no tenderness. There is no CVA tenderness.   Musculoskeletal:        Back:    Paraspinal muscle tension, worse on the left.  Painful to palpation. Painful with strength and range of motion testing. No midline tenderness, deformities, or step-offs. No edema or skin changes.    No CVA tenderness.   Neurological: She is alert and oriented to person, place, and time.   Skin: Skin is warm and dry. She is not diaphoretic.   Psychiatric: She has a normal mood and affect.   Nursing note and vitals reviewed.       ED Course      Procedures  none    ED Medication Orders     Start Ordered     Status Ordering Provider    08/11/18 1019 08/11/18 1018  HYDROcodone-acetaminophen (NORCO) 5-325 MG per tablet 1 tablet  ONCE      Last MAR action:  Given WILLOW ZARATE    08/11/18 1001 08/11/18 1000  ondansetron (ZOFRAN ODT) disintegrating tablet 4 mg  ONCE      Last MAR action:  Given WILLOW ZARATE    08/11/18 0941 08/11/18 0940  HYDROcodone-acetaminophen (NORCO) 5-325 MG per tablet 1 tablet  ONCE      Last MAR action:  Given WILLOW ZARATE    08/11/18 0941 08/11/18 0940  cyclobenzaprine (FLEXERIL) tablet 5 mg  ONCE      Last MAR action:  Given WILLOW ZARATE           Lab Results     No results found for this visit on 08/11/18.         Radiology Results     No orders to display         Medical Decision Making   This is a 24 year old female, history of seizures, migraines, who presented to the ED with low back pain.  Patient was given Norco and Flexeril in the emergency department.  She is nauseous when she takes hydrocodone, so she requests Zofran which was given.  On recheck, patient's pain is at a 5/10. She feels that she can go home at this time. Supportive care discussed.  All criteria for return to emergency department discussed with patient. We'll send her  home with Flexeril. Medication side effects discussed.  No sign of kidney stones.  She will follow-up with primary care provider.    Recheck       Discussed ED findings and plan for discharge. Patient was given ED warnings, discharge instructions, and follow up information to go home. Patient verbalizes understanding and agreement with plan for discharge. Any questions have been answered.    Clinical Impression     ED Diagnosis        Final diagnosis    Strain of lumbar region, subsequent encounter           Disposition     New Prescriptions    CYCLOBENZAPRINE (FLEXERIL) 10 MG TABLET    Take 1 tablet by mouth 3 times daily as needed for Muscle spasms.    NAPROXEN (NAPROSYN) 500 MG TABLET    Take 1 tablet by mouth 2 times daily (with meals).       Follow Up  Peng Varma MD  [address]  [facility] [address]  [phone]      As needed    [facility] Emergency [facility]  [address]   [place] [address]  [phone]    If symptoms worsen     Patient was instructed to return to the ED immediately if symptoms worsen or any new unusual symptoms arise.    Closure  The patient understands that this is a provisional diagnosis. Provisional diagnosis can and do change. The diagnosis that you are discharged with today is based on the symptoms with which you presented today. If any new symptoms occur or worsen, you should seek immediate attention for re-evaluation. Any symptoms that persist or fail to completely resolve require further evaluation by your other healthcare provider(s).       Aj Brewer PA-C  08/11/18 1046

## 2021-06-16 RX ORDER — FLUTICASONE PROPIONATE 50 MCG
SPRAY, SUSPENSION (ML) NASAL
Refills: 0 | COMMUNITY
Start: 2021-06-16 | End: 2022-01-24

## 2021-06-16 NOTE — TELEPHONE ENCOUNTER
Pharmacy sent request yesterday that Fluticasone was not preferred and mometasone was. Now getting PA. Spoke with [facility] they report Fluticasone is preferred but pharmacy is running a NDC that is not preferred.    preferred by Inocente Pastrana

## 2021-08-02 DIAGNOSIS — E03.9 HYPOTHYROIDISM, UNSPECIFIED TYPE: ICD-10-CM

## 2021-08-02 RX ORDER — LEVOTHYROXINE SODIUM 0.05 MG/1
TABLET ORAL
Qty: 90 TABLET | Refills: 0 | Status: SHIPPED | OUTPATIENT
Start: 2021-08-02 | End: 2021-11-26

## 2021-09-09 ENCOUNTER — PATIENT OUTREACH (OUTPATIENT)
Dept: SURGERY | Facility: CLINIC | Age: 61
End: 2021-09-09

## 2021-09-15 ENCOUNTER — TELEMEDICINE (OUTPATIENT)
Dept: INTERNAL MEDICINE CLINIC | Facility: CLINIC | Age: 61
End: 2021-09-15
Payer: COMMERCIAL

## 2021-09-15 DIAGNOSIS — M89.8X8 MASS OF STERNUM: Primary | ICD-10-CM

## 2021-09-15 PROCEDURE — 99213 OFFICE O/P EST LOW 20 MIN: CPT | Performed by: PHYSICIAN ASSISTANT

## 2021-09-15 NOTE — PROGRESS NOTES
Ericka Guthrie is a 64year old female. HPI:   This visit is conducted using Telemedicine with live, interactive video and audio. Patient consents to video visit today to discuss lump in middle of chest. Seems to come and go. Present x 2 weeks.  Phoebe Escobar denies abdominal pain and denies heartburn.  Denies nausea, vomiting, diarrhea, constipation  NEURO: denies headaches, dizziness, weakness, syncope    EXAM:   No vitals for video visit  Physical Exam  - well appearing via video visit  - no visible rash or d

## 2021-09-16 ENCOUNTER — HOSPITAL ENCOUNTER (OUTPATIENT)
Dept: GENERAL RADIOLOGY | Facility: HOSPITAL | Age: 61
Discharge: HOME | End: 2021-09-16
Attending: INTERNAL MEDICINE
Payer: COMMERCIAL

## 2021-09-16 ENCOUNTER — HOSPITAL ENCOUNTER (OUTPATIENT)
Dept: GENERAL RADIOLOGY | Facility: HOSPITAL | Age: 61
Discharge: HOME | End: 2021-09-16
Attending: PHYSICIAN ASSISTANT
Payer: COMMERCIAL

## 2021-09-16 DIAGNOSIS — M89.8X8 MASS OF STERNUM: ICD-10-CM

## 2021-09-16 DIAGNOSIS — E78.2 MIXED HYPERLIPIDEMIA: ICD-10-CM

## 2021-09-16 PROCEDURE — 71120 X-RAY EXAM BREASTBONE 2/>VWS: CPT | Performed by: PHYSICIAN ASSISTANT

## 2021-09-16 PROCEDURE — 73560 X-RAY EXAM OF KNEE 1 OR 2: CPT | Performed by: INTERNAL MEDICINE

## 2021-10-06 DIAGNOSIS — J30.2 SEASONAL ALLERGIES: ICD-10-CM

## 2021-10-06 RX ORDER — MONTELUKAST SODIUM 10 MG/1
TABLET ORAL
Qty: 30 TABLET | Refills: 3 | Status: SHIPPED | OUTPATIENT
Start: 2021-10-06

## 2021-10-06 NOTE — TELEPHONE ENCOUNTER
Last time medication was refilled 2/8/21  Quantity and number of refills 30 w/ 3  Last OV 4/12/21  Next OV 1/2022

## 2021-11-06 ENCOUNTER — OFFICE VISIT (OUTPATIENT)
Dept: INTERNAL MEDICINE CLINIC | Facility: CLINIC | Age: 61
End: 2021-11-06
Payer: COMMERCIAL

## 2021-11-06 VITALS
TEMPERATURE: 97 F | HEIGHT: 59.5 IN | HEART RATE: 71 BPM | OXYGEN SATURATION: 98 % | DIASTOLIC BLOOD PRESSURE: 52 MMHG | WEIGHT: 148 LBS | BODY MASS INDEX: 29.44 KG/M2 | RESPIRATION RATE: 16 BRPM | SYSTOLIC BLOOD PRESSURE: 100 MMHG

## 2021-11-06 DIAGNOSIS — M54.16 LUMBAR RADICULOPATHY: Primary | ICD-10-CM

## 2021-11-06 PROCEDURE — 99213 OFFICE O/P EST LOW 20 MIN: CPT | Performed by: NURSE PRACTITIONER

## 2021-11-06 PROCEDURE — 3078F DIAST BP <80 MM HG: CPT | Performed by: NURSE PRACTITIONER

## 2021-11-06 PROCEDURE — 3074F SYST BP LT 130 MM HG: CPT | Performed by: NURSE PRACTITIONER

## 2021-11-06 PROCEDURE — 3008F BODY MASS INDEX DOCD: CPT | Performed by: NURSE PRACTITIONER

## 2021-11-06 RX ORDER — PREDNISONE 20 MG/1
40 TABLET ORAL DAILY
Qty: 10 TABLET | Refills: 0 | Status: SHIPPED | OUTPATIENT
Start: 2021-11-06 | End: 2021-11-11

## 2021-11-06 NOTE — PROGRESS NOTES
HPI:    Patient ID: Yenny Kuhn is a 64year old female. Patient presents with:  Low Back Pain: x a couple of months, worse x 4wks, has tingling/numbness in so feet but worse in Lt foot      Low Back Pain  This is a recurrent problem.  The current status: Never Smoker      Smokeless tobacco: Never Used      Tobacco comment: non-smoker    Vaping Use      Vaping Use: Never used    Substance and Sexual Activity      Alcohol use: Yes        Comment: occasionally      Drug use: No         Current Outpati Component Value Date    CHOLEST 218 (H) 05/08/2021    TRIG 78 05/08/2021    HDL 59 05/08/2021     (H) 05/08/2021    VLDL 16 05/08/2021    NONHDLC 159 (H) 05/08/2021     Lab Results   Component Value Date     01/11/2021    A1C 5.5 01/11/2021

## 2021-11-25 DIAGNOSIS — E03.9 HYPOTHYROIDISM, UNSPECIFIED TYPE: ICD-10-CM

## 2021-11-26 RX ORDER — LEVOTHYROXINE SODIUM 0.05 MG/1
TABLET ORAL
Qty: 90 TABLET | Refills: 0 | Status: SHIPPED | OUTPATIENT
Start: 2021-11-26

## 2021-12-20 ENCOUNTER — LAB ENCOUNTER (OUTPATIENT)
Dept: LAB | Age: 61
End: 2021-12-20
Attending: INTERNAL MEDICINE
Payer: COMMERCIAL

## 2021-12-20 ENCOUNTER — TELEPHONE (OUTPATIENT)
Dept: INTERNAL MEDICINE CLINIC | Facility: CLINIC | Age: 61
End: 2021-12-20

## 2021-12-20 DIAGNOSIS — R09.81 SINUS CONGESTION: Primary | ICD-10-CM

## 2021-12-20 DIAGNOSIS — R09.81 SINUS CONGESTION: ICD-10-CM

## 2021-12-20 NOTE — TELEPHONE ENCOUNTER
Patient experiencing Sharp pain on temples.  Nose burn comes and goes would like something prescribed

## 2021-12-20 NOTE — TELEPHONE ENCOUNTER
Vaccinated: Yes Last Dose: April Booster:   Influenza Vaccine: No  Covid Contacts: No  Travel: No  Symptom onset: 12/16  Covid Testing: No  Fever: No  Cough: No  Shortness of Breath: No  Loss of taste/smell: No  Sore Throat: No  Headache: No  Body Aches: N

## 2021-12-21 PROBLEM — Z86.16 PERSONAL HISTORY OF COVID-19: Status: ACTIVE | Noted: 2021-12-21

## 2021-12-22 ENCOUNTER — TELEPHONE (OUTPATIENT)
Dept: INTERNAL MEDICINE CLINIC | Facility: CLINIC | Age: 61
End: 2021-12-22

## 2021-12-22 NOTE — TELEPHONE ENCOUNTER
Discussed with patient results and recommendations.     Symptom onset: 12/16  Fever: Denies   Cough: Denies   Shortness of Breath:Denies   Loss of taste/smell: Denies   Sore Throat: Denies   Headache: Pressure at temporal area   Body Aches: Denies   Other s

## 2021-12-22 NOTE — TELEPHONE ENCOUNTER
----- Message from Wilma Ruiz MD sent at 12/21/2021  9:27 PM CST -----  VM left for pt of positive covid screen  Per RN note no respiratory sxs.  Would recommend monitor sxs and cont mucinex  Place on covid protocol

## 2021-12-27 ENCOUNTER — TELEPHONE (OUTPATIENT)
Dept: INTERNAL MEDICINE CLINIC | Facility: CLINIC | Age: 61
End: 2021-12-27

## 2021-12-27 NOTE — TELEPHONE ENCOUNTER
Confirmed COVID f/u call  Symptom onset: 12/16  Isolation period ends: 12/26   Symptoms improved: yes  Temperature: No  Cough: No  Shortness of breath: No  Any other symptoms: none   Pt has met the following CDC Guidelines and has discontinued isolation.

## 2022-01-14 ENCOUNTER — OFFICE VISIT (OUTPATIENT)
Dept: INTERNAL MEDICINE CLINIC | Facility: CLINIC | Age: 62
End: 2022-01-14
Payer: COMMERCIAL

## 2022-01-14 ENCOUNTER — LAB ENCOUNTER (OUTPATIENT)
Dept: LAB | Age: 62
End: 2022-01-14
Attending: PHYSICIAN ASSISTANT
Payer: COMMERCIAL

## 2022-01-14 VITALS
TEMPERATURE: 98 F | RESPIRATION RATE: 12 BRPM | DIASTOLIC BLOOD PRESSURE: 72 MMHG | OXYGEN SATURATION: 98 % | HEIGHT: 59.5 IN | HEART RATE: 74 BPM | WEIGHT: 148 LBS | BODY MASS INDEX: 29.44 KG/M2 | SYSTOLIC BLOOD PRESSURE: 118 MMHG

## 2022-01-14 DIAGNOSIS — L20.9 ATOPIC DERMATITIS, UNSPECIFIED TYPE: ICD-10-CM

## 2022-01-14 DIAGNOSIS — E03.9 HYPOTHYROIDISM, ACQUIRED: Primary | ICD-10-CM

## 2022-01-14 DIAGNOSIS — H10.45 OTHER CHRONIC ALLERGIC CONJUNCTIVITIS OF BOTH EYES: ICD-10-CM

## 2022-01-14 DIAGNOSIS — E03.9 HYPOTHYROIDISM, ACQUIRED: ICD-10-CM

## 2022-01-14 LAB — TSI SER-ACNC: 2.44 MIU/ML (ref 0.36–3.74)

## 2022-01-14 PROCEDURE — 99214 OFFICE O/P EST MOD 30 MIN: CPT | Performed by: PHYSICIAN ASSISTANT

## 2022-01-14 PROCEDURE — 3074F SYST BP LT 130 MM HG: CPT | Performed by: PHYSICIAN ASSISTANT

## 2022-01-14 PROCEDURE — 3008F BODY MASS INDEX DOCD: CPT | Performed by: PHYSICIAN ASSISTANT

## 2022-01-14 PROCEDURE — 3078F DIAST BP <80 MM HG: CPT | Performed by: PHYSICIAN ASSISTANT

## 2022-01-14 PROCEDURE — 84443 ASSAY THYROID STIM HORMONE: CPT | Performed by: PHYSICIAN ASSISTANT

## 2022-01-14 RX ORDER — FLUOCINONIDE 0.5 MG/G
OINTMENT TOPICAL
Qty: 30 G | Refills: 2 | Status: SHIPPED | OUTPATIENT
Start: 2022-01-14

## 2022-01-14 NOTE — PROGRESS NOTES
Subjective:   Patient ID: Stephanie Rivera is a 63year old female. Patient presents with:  Derm Problem: on arms on/off   Eye Problem: both R>L w/ burning      Eye Problem   Both eyes are affected. This is a chronic problem.  The current episode started m • loratadine 10 MG Oral Tab      • Multiple Vitamins-Minerals (MULTIVITAMIN ADULT OR) every other day. • Ascorbic Acid (VITAMIN C) 100 MG Oral Tab Take 100 mg by mouth daily.        Allergies:  Ciprofloxacin           HIVES  Latex Placed This Encounter      TSH W Reflex To Free T4      Meds This Visit:  Requested Prescriptions     Signed Prescriptions Disp Refills   • fluocinonide 0.05 % External Ointment 30 g 2     Sig: Apply to affected area twice daily       Imaging & Referrals:

## 2022-01-21 RX ORDER — OLOPATADINE HYDROCHLORIDE 1 MG/ML
SOLUTION/ DROPS OPHTHALMIC
Qty: 5 ML | Refills: 0 | Status: SHIPPED | OUTPATIENT
Start: 2022-01-21

## 2022-01-21 NOTE — TELEPHONE ENCOUNTER
Requested Prescriptions     Pending Prescriptions Disp Refills   • OLOPATADINE 0.1 % Ophthalmic Solution [Pharmacy Med Name: OLOPATADINE 0.1% OPTH SOLN 5ML] 5 mL 0     Sig: INSTILL 1 DROP IN RIGHT EYE TWICE DAILY     Last refill 5mL on 4/30/2021  Last offi

## 2022-01-24 ENCOUNTER — TELEPHONE (OUTPATIENT)
Dept: INTERNAL MEDICINE CLINIC | Facility: CLINIC | Age: 62
End: 2022-01-24

## 2022-01-24 DIAGNOSIS — J30.2 SEASONAL ALLERGIES: Primary | ICD-10-CM

## 2022-01-24 RX ORDER — MOMETASONE 50 UG/1
2 SPRAY, METERED NASAL DAILY
Qty: 17 G | Refills: 1 | Status: SHIPPED | OUTPATIENT
Start: 2022-01-24 | End: 2022-01-28

## 2022-01-24 NOTE — TELEPHONE ENCOUNTER
Per Dr. Hannah Bangura change to [facility] 2 sprays daily. Rx sent. Pt aware.

## 2022-01-28 ENCOUNTER — TELEPHONE (OUTPATIENT)
Dept: INTERNAL MEDICINE CLINIC | Facility: CLINIC | Age: 62
End: 2022-01-28

## 2022-01-28 DIAGNOSIS — J30.2 SEASONAL ALLERGIES: Primary | ICD-10-CM

## 2022-01-28 RX ORDER — FLUNISOLIDE 0.25 MG/ML
2 SOLUTION NASAL 2 TIMES DAILY
Qty: 25 ML | Refills: 1 | Status: SHIPPED | OUTPATIENT
Start: 2022-01-28

## 2022-01-28 NOTE — TELEPHONE ENCOUNTER
Patient called in stating she's feeling dizzy on and off and would like to speak with clinical staff.

## 2022-02-09 RX ORDER — LEVOTHYROXINE SODIUM 0.05 MG/1
TABLET ORAL
Qty: 90 TABLET | Refills: 0 | OUTPATIENT
Start: 2022-02-09

## 2022-02-10 ENCOUNTER — TELEPHONE (OUTPATIENT)
Dept: INTERNAL MEDICINE CLINIC | Facility: CLINIC | Age: 62
End: 2022-02-10

## 2022-02-10 RX ORDER — LEVOTHYROXINE SODIUM 0.05 MG/1
50 TABLET ORAL DAILY
Qty: 90 TABLET | Refills: 3 | Status: SHIPPED | OUTPATIENT
Start: 2022-02-10

## 2022-02-10 NOTE — TELEPHONE ENCOUNTER
Levothyroxine 50mcg     Pt completely out     Appt on 2/12 with Dr. Naveen Jones [address], [address] [place] [address] AT [address], [phone], [phone]

## 2022-02-12 ENCOUNTER — LAB ENCOUNTER (OUTPATIENT)
Dept: LAB | Age: 62
End: 2022-02-12
Attending: INTERNAL MEDICINE
Payer: COMMERCIAL

## 2022-02-12 ENCOUNTER — OFFICE VISIT (OUTPATIENT)
Dept: INTERNAL MEDICINE CLINIC | Facility: CLINIC | Age: 62
End: 2022-02-12
Payer: COMMERCIAL

## 2022-02-12 VITALS
SYSTOLIC BLOOD PRESSURE: 120 MMHG | RESPIRATION RATE: 14 BRPM | HEIGHT: 59.5 IN | TEMPERATURE: 98 F | HEART RATE: 70 BPM | DIASTOLIC BLOOD PRESSURE: 60 MMHG | WEIGHT: 148 LBS | BODY MASS INDEX: 29.44 KG/M2

## 2022-02-12 DIAGNOSIS — Z13.220 LIPID SCREENING: ICD-10-CM

## 2022-02-12 DIAGNOSIS — Z00.00 LABORATORY EXAMINATION ORDERED AS PART OF A ROUTINE GENERAL MEDICAL EXAMINATION: ICD-10-CM

## 2022-02-12 DIAGNOSIS — Z13.89 SCREENING FOR GENITOURINARY CONDITION: ICD-10-CM

## 2022-02-12 DIAGNOSIS — Z13.29 THYROID DISORDER SCREEN: ICD-10-CM

## 2022-02-12 DIAGNOSIS — Z13.0 SCREENING, IRON DEFICIENCY ANEMIA: ICD-10-CM

## 2022-02-12 DIAGNOSIS — H69.81 DYSFUNCTION OF RIGHT EUSTACHIAN TUBE: ICD-10-CM

## 2022-02-12 DIAGNOSIS — Z12.31 ENCOUNTER FOR SCREENING MAMMOGRAM FOR MALIGNANT NEOPLASM OF BREAST: ICD-10-CM

## 2022-02-12 DIAGNOSIS — Z00.00 ANNUAL PHYSICAL EXAM: Primary | ICD-10-CM

## 2022-02-12 LAB
ALBUMIN SERPL-MCNC: 3.8 G/DL (ref 3.4–5)
ALBUMIN/GLOB SERPL: 1.2 {RATIO} (ref 1–2)
ALP LIVER SERPL-CCNC: 61 U/L
ALT SERPL-CCNC: 22 U/L
ANION GAP SERPL CALC-SCNC: 4 MMOL/L (ref 0–18)
AST SERPL-CCNC: 18 U/L (ref 15–37)
BASOPHILS # BLD AUTO: 0.05 X10(3) UL (ref 0–0.2)
BILIRUB SERPL-MCNC: 0.4 MG/DL (ref 0.1–2)
BILIRUB UR QL STRIP.AUTO: NEGATIVE
BUN BLD-MCNC: 11 MG/DL (ref 7–18)
CALCIUM BLD-MCNC: 9.2 MG/DL (ref 8.5–10.1)
CHLORIDE SERPL-SCNC: 110 MMOL/L (ref 98–112)
CHOLEST SERPL-MCNC: 229 MG/DL (ref ?–200)
CLARITY UR REFRACT.AUTO: CLEAR
CO2 SERPL-SCNC: 28 MMOL/L (ref 21–32)
CREAT BLD-MCNC: 0.88 MG/DL
EOSINOPHIL # BLD AUTO: 0.12 X10(3) UL (ref 0–0.7)
EOSINOPHIL NFR BLD AUTO: 1.7 %
ERYTHROCYTE [DISTWIDTH] IN BLOOD BY AUTOMATED COUNT: 12.6 %
EST. AVERAGE GLUCOSE BLD GHB EST-MCNC: 103 MG/DL (ref 68–126)
FASTING PATIENT LIPID ANSWER: YES
FASTING STATUS PATIENT QL REPORTED: YES
GLOBULIN PLAS-MCNC: 3.1 G/DL (ref 2.8–4.4)
GLUCOSE BLD-MCNC: 86 MG/DL (ref 70–99)
GLUCOSE UR STRIP.AUTO-MCNC: NEGATIVE MG/DL
HBA1C MFR BLD: 5.2 % (ref ?–5.7)
HCT VFR BLD AUTO: 41 %
HDLC SERPL-MCNC: 59 MG/DL (ref 40–59)
HGB BLD-MCNC: 13.3 G/DL
IMM GRANULOCYTES # BLD AUTO: 0.02 X10(3) UL (ref 0–1)
IMM GRANULOCYTES NFR BLD: 0.3 %
KETONES UR STRIP.AUTO-MCNC: NEGATIVE MG/DL
LDLC SERPL CALC-MCNC: 154 MG/DL (ref ?–100)
LYMPHOCYTES # BLD AUTO: 2.54 X10(3) UL (ref 1–4)
LYMPHOCYTES NFR BLD AUTO: 34.9 %
MCH RBC QN AUTO: 33.3 PG (ref 26–34)
MCHC RBC AUTO-ENTMCNC: 32.4 G/DL (ref 31–37)
MCV RBC AUTO: 102.8 FL
MONOCYTES # BLD AUTO: 0.55 X10(3) UL (ref 0.1–1)
MONOCYTES NFR BLD AUTO: 7.6 %
NEUTROPHILS # BLD AUTO: 3.99 X10 (3) UL (ref 1.5–7.7)
NEUTROPHILS # BLD AUTO: 3.99 X10(3) UL (ref 1.5–7.7)
NEUTROPHILS NFR BLD AUTO: 54.8 %
NITRITE UR QL STRIP.AUTO: NEGATIVE
NONHDLC SERPL-MCNC: 170 MG/DL (ref ?–130)
OSMOLALITY SERPL CALC.SUM OF ELEC: 293 MOSM/KG (ref 275–295)
PH UR STRIP.AUTO: 6 [PH] (ref 5–8)
PLATELET # BLD AUTO: 274 10(3)UL (ref 150–450)
POTASSIUM SERPL-SCNC: 4.4 MMOL/L (ref 3.5–5.1)
PROT SERPL-MCNC: 6.9 G/DL (ref 6.4–8.2)
PROT UR STRIP.AUTO-MCNC: NEGATIVE MG/DL
RBC # BLD AUTO: 3.99 X10(6)UL
RBC UR QL AUTO: NEGATIVE
SODIUM SERPL-SCNC: 142 MMOL/L (ref 136–145)
SP GR UR STRIP.AUTO: 1.01 (ref 1–1.03)
TRIGL SERPL-MCNC: 91 MG/DL (ref 30–149)
UROBILINOGEN UR STRIP.AUTO-MCNC: <2 MG/DL
VLDLC SERPL CALC-MCNC: 17 MG/DL (ref 0–30)
WBC # BLD AUTO: 7.3 X10(3) UL (ref 4–11)

## 2022-02-12 PROCEDURE — 85025 COMPLETE CBC W/AUTO DIFF WBC: CPT

## 2022-02-12 PROCEDURE — 81001 URINALYSIS AUTO W/SCOPE: CPT

## 2022-02-12 PROCEDURE — 3078F DIAST BP <80 MM HG: CPT | Performed by: INTERNAL MEDICINE

## 2022-02-12 PROCEDURE — 80061 LIPID PANEL: CPT

## 2022-02-12 PROCEDURE — 83036 HEMOGLOBIN GLYCOSYLATED A1C: CPT

## 2022-02-12 PROCEDURE — 3008F BODY MASS INDEX DOCD: CPT | Performed by: INTERNAL MEDICINE

## 2022-02-12 PROCEDURE — 3074F SYST BP LT 130 MM HG: CPT | Performed by: INTERNAL MEDICINE

## 2022-02-12 PROCEDURE — 80053 COMPREHEN METABOLIC PANEL: CPT

## 2022-02-12 PROCEDURE — 99396 PREV VISIT EST AGE 40-64: CPT | Performed by: INTERNAL MEDICINE

## 2022-02-12 PROCEDURE — 36415 COLL VENOUS BLD VENIPUNCTURE: CPT

## 2022-02-12 RX ORDER — METHYLPREDNISOLONE 4 MG/1
TABLET ORAL
Qty: 1 EACH | Refills: 0 | Status: SHIPPED | OUTPATIENT
Start: 2022-02-12

## 2022-03-17 ENCOUNTER — TELEPHONE (OUTPATIENT)
Dept: INTERNAL MEDICINE CLINIC | Facility: CLINIC | Age: 62
End: 2022-03-17

## 2022-03-17 NOTE — TELEPHONE ENCOUNTER
Patient has a form from her employer to be completed. She had a physical on 2/12/22. She is asking if she can fax the form or does she need an appointment?

## 2022-03-22 NOTE — TELEPHONE ENCOUNTER
Form received via [employer]. Pt will need the following labs:  Quantiferon TB, MMR titers. D/w pt order placed. She will complete. Form signed and in triage awaiting results.

## 2022-03-28 ENCOUNTER — HOSPITAL ENCOUNTER (OUTPATIENT)
Dept: MAMMOGRAPHY | Age: 62
Discharge: HOME | End: 2022-03-28
Attending: INTERNAL MEDICINE
Payer: COMMERCIAL

## 2022-03-28 ENCOUNTER — LAB ENCOUNTER (OUTPATIENT)
Dept: LAB | Age: 62
End: 2022-03-28
Attending: INTERNAL MEDICINE
Payer: COMMERCIAL

## 2022-03-28 DIAGNOSIS — Z01.84 IMMUNITY STATUS TESTING: ICD-10-CM

## 2022-03-28 DIAGNOSIS — Z12.31 ENCOUNTER FOR SCREENING MAMMOGRAM FOR MALIGNANT NEOPLASM OF BREAST: ICD-10-CM

## 2022-03-28 LAB
RUBV IGG SER QL: POSITIVE
RUBV IGG SER-ACNC: 221.8 IU/ML (ref 10–?)

## 2022-03-28 PROCEDURE — 77067 SCR MAMMO BI INCL CAD: CPT | Performed by: INTERNAL MEDICINE

## 2022-03-28 PROCEDURE — 86762 RUBELLA ANTIBODY: CPT | Performed by: INTERNAL MEDICINE

## 2022-03-28 PROCEDURE — 86765 RUBEOLA ANTIBODY: CPT | Performed by: INTERNAL MEDICINE

## 2022-03-28 PROCEDURE — 77063 BREAST TOMOSYNTHESIS BI: CPT | Performed by: INTERNAL MEDICINE

## 2022-03-28 PROCEDURE — 86735 MUMPS ANTIBODY: CPT | Performed by: INTERNAL MEDICINE

## 2022-03-28 PROCEDURE — 86480 TB TEST CELL IMMUN MEASURE: CPT | Performed by: INTERNAL MEDICINE

## 2022-03-30 ENCOUNTER — TELEPHONE (OUTPATIENT)
Dept: INTERNAL MEDICINE CLINIC | Facility: CLINIC | Age: 62
End: 2022-03-30

## 2022-03-30 LAB
M TB IFN-G CD4+ T-CELLS BLD-ACNC: 0.08 IU/ML
M TB TUBERC IFN-G BLD QL: NEGATIVE
M TB TUBERC IGNF/MITOGEN IGNF CONTROL: >10 IU/ML
MEV IGG SER-ACNC: 148 AU/ML (ref 16.5–?)
MUV IGG SER IA-ACNC: 238 AU/ML (ref 11–?)
QFT TB1 AG MINUS NIL: 0 IU/ML
QFT TB2 AG MINUS NIL: -0.01 IU/ML

## 2022-03-30 RX ORDER — OLOPATADINE HYDROCHLORIDE 1 MG/ML
1 SOLUTION/ DROPS OPHTHALMIC 2 TIMES DAILY
Qty: 5 ML | Refills: 0 | Status: SHIPPED | OUTPATIENT
Start: 2022-03-30

## 2022-03-30 NOTE — TELEPHONE ENCOUNTER
Pt c/o allergies in eyes, pt used an OTC eye drop that irritated her eyes further, requesting a refill on Olopatadine which provided significant relief in the past.  Last time medication was refilled 1/21/22  Quantity and # of refills 5ml w/ 0 refill  Last OV 2/12/22  Next OV 2/11/23

## 2022-03-31 NOTE — PROGRESS NOTES
3/4/2019              [address]         Dear Kyle Polanco,    Our records indicate that the tests ordered for you by Fannie Gale MD  have not been done.   If you have, in fact, already completed the t Spoke to pt. Made aware of results & recommendations. Pt voiced understanding.

## 2022-05-13 ENCOUNTER — TELEPHONE (OUTPATIENT)
Dept: INTERNAL MEDICINE CLINIC | Facility: CLINIC | Age: 62
End: 2022-05-13

## 2022-05-13 ENCOUNTER — OFFICE VISIT (OUTPATIENT)
Dept: INTERNAL MEDICINE CLINIC | Facility: CLINIC | Age: 62
End: 2022-05-13
Payer: COMMERCIAL

## 2022-05-13 VITALS
RESPIRATION RATE: 16 BRPM | SYSTOLIC BLOOD PRESSURE: 112 MMHG | DIASTOLIC BLOOD PRESSURE: 60 MMHG | TEMPERATURE: 98 F | HEART RATE: 67 BPM | WEIGHT: 149 LBS | OXYGEN SATURATION: 98 % | BODY MASS INDEX: 29.64 KG/M2 | HEIGHT: 59.5 IN

## 2022-05-13 DIAGNOSIS — R39.89 URINE DISCOLORATION: Primary | ICD-10-CM

## 2022-05-13 DIAGNOSIS — R82.90 CLOUDY URINE: ICD-10-CM

## 2022-05-13 LAB
BILIRUBIN: NEGATIVE
GLUCOSE (URINE DIPSTICK): NEGATIVE MG/DL
KETONES (URINE DIPSTICK): NEGATIVE MG/DL
MULTISTIX LOT#: ABNORMAL NUMERIC
NITRITE, URINE: NEGATIVE
OCCULT BLOOD: NEGATIVE
PH, URINE: 7 (ref 4.5–8)
PROTEIN (URINE DIPSTICK): NEGATIVE MG/DL
SPECIFIC GRAVITY: 1.01 (ref 1–1.03)
UROBILINOGEN,SEMI-QN: 0.2 MG/DL (ref 0–1.9)

## 2022-05-13 PROCEDURE — 3008F BODY MASS INDEX DOCD: CPT

## 2022-05-13 PROCEDURE — 3074F SYST BP LT 130 MM HG: CPT

## 2022-05-13 PROCEDURE — 99213 OFFICE O/P EST LOW 20 MIN: CPT

## 2022-05-13 PROCEDURE — 87086 URINE CULTURE/COLONY COUNT: CPT

## 2022-05-13 PROCEDURE — 3078F DIAST BP <80 MM HG: CPT

## 2022-05-13 PROCEDURE — 81003 URINALYSIS AUTO W/O SCOPE: CPT

## 2022-05-13 NOTE — TELEPHONE ENCOUNTER
Spoke with pt she reports X 4 to 5 days her urine is a different color. She is concerned. No other symptoms. Appt scheduled for today.

## 2022-08-12 DIAGNOSIS — J30.2 SEASONAL ALLERGIES: ICD-10-CM

## 2022-08-12 RX ORDER — MONTELUKAST SODIUM 10 MG/1
TABLET ORAL
Qty: 30 TABLET | Refills: 5 | Status: SHIPPED | OUTPATIENT
Start: 2022-08-12

## 2022-09-14 ENCOUNTER — OFFICE VISIT (OUTPATIENT)
Dept: INTERNAL MEDICINE CLINIC | Facility: CLINIC | Age: 62
End: 2022-09-14
Payer: COMMERCIAL

## 2022-09-14 VITALS
OXYGEN SATURATION: 98 % | RESPIRATION RATE: 16 BRPM | DIASTOLIC BLOOD PRESSURE: 80 MMHG | HEIGHT: 59.5 IN | SYSTOLIC BLOOD PRESSURE: 108 MMHG | TEMPERATURE: 98 F | BODY MASS INDEX: 29.64 KG/M2 | HEART RATE: 53 BPM | WEIGHT: 149 LBS

## 2022-09-14 DIAGNOSIS — M46.1 SI (SACROILIAC) JOINT INFLAMMATION (HCC): Primary | ICD-10-CM

## 2022-09-14 PROCEDURE — 99213 OFFICE O/P EST LOW 20 MIN: CPT

## 2022-09-14 PROCEDURE — 3008F BODY MASS INDEX DOCD: CPT

## 2022-09-14 PROCEDURE — 3074F SYST BP LT 130 MM HG: CPT

## 2022-09-14 PROCEDURE — 3079F DIAST BP 80-89 MM HG: CPT

## 2022-09-14 RX ORDER — PREDNISONE 20 MG/1
40 TABLET ORAL DAILY
Qty: 10 TABLET | Refills: 0 | Status: SHIPPED | OUTPATIENT
Start: 2022-09-14 | End: 2022-09-19

## 2022-10-13 DIAGNOSIS — M46.1 SI (SACROILIAC) JOINT INFLAMMATION (HCC): ICD-10-CM

## 2022-10-14 RX ORDER — PREDNISONE 20 MG/1
TABLET ORAL
Qty: 10 TABLET | Refills: 0 | OUTPATIENT
Start: 2022-10-14

## 2022-10-19 DIAGNOSIS — L20.9 ATOPIC DERMATITIS, UNSPECIFIED TYPE: ICD-10-CM

## 2022-10-19 RX ORDER — FLUOCINONIDE 0.5 MG/G
OINTMENT TOPICAL
Qty: 30 G | Refills: 2 | Status: SHIPPED | OUTPATIENT
Start: 2022-10-19

## 2022-10-19 NOTE — TELEPHONE ENCOUNTER
fluocinonide 0.05 % External Ointment     [id number] - [facility] [address] AT [address], [phone], [phone]

## 2022-11-03 ENCOUNTER — TELEPHONE (OUTPATIENT)
Dept: INTERNAL MEDICINE CLINIC | Facility: CLINIC | Age: 62
End: 2022-11-03

## 2022-11-03 NOTE — TELEPHONE ENCOUNTER
Unusual color of urine    Lower back mainly back pain. Comes and go  Both sides but left is worse.      No burning     \"Something is brewing\"     Please call to discuss

## 2022-11-03 NOTE — TELEPHONE ENCOUNTER
Pt called back and appt given for tomorrow with Jaki Ricoke NP    Future Appointments   Date Time Provider [place]   11/4/2022 10:00 AM JOSE Bahena [facility] [address]   2/11/2023  7:30 AM Donnell Rausch MD [facility] [address]

## 2022-11-04 ENCOUNTER — OFFICE VISIT (OUTPATIENT)
Dept: INTERNAL MEDICINE CLINIC | Facility: CLINIC | Age: 62
End: 2022-11-04
Payer: COMMERCIAL

## 2022-11-04 VITALS
DIASTOLIC BLOOD PRESSURE: 66 MMHG | RESPIRATION RATE: 16 BRPM | SYSTOLIC BLOOD PRESSURE: 104 MMHG | HEART RATE: 58 BPM | HEIGHT: 59.5 IN | BODY MASS INDEX: 28.85 KG/M2 | WEIGHT: 145 LBS | OXYGEN SATURATION: 98 % | TEMPERATURE: 98 F

## 2022-11-04 DIAGNOSIS — H57.9 EYE LESION: ICD-10-CM

## 2022-11-04 DIAGNOSIS — N30.00 ACUTE CYSTITIS WITHOUT HEMATURIA: Primary | ICD-10-CM

## 2022-11-04 LAB
APPEARANCE: CLEAR
BILIRUBIN: NEGATIVE
GLUCOSE (URINE DIPSTICK): NEGATIVE MG/DL
KETONES (URINE DIPSTICK): NEGATIVE MG/DL
MULTISTIX LOT#: ABNORMAL NUMERIC
NITRITE, URINE: NEGATIVE
OCCULT BLOOD: NEGATIVE
PH, URINE: 6 (ref 4.5–8)
PROTEIN (URINE DIPSTICK): NEGATIVE MG/DL
SPECIFIC GRAVITY: 1.02 (ref 1–1.03)
URINE-COLOR: YELLOW
UROBILINOGEN,SEMI-QN: 0.2 MG/DL (ref 0–1.9)

## 2022-11-04 PROCEDURE — 81003 URINALYSIS AUTO W/O SCOPE: CPT

## 2022-11-04 PROCEDURE — 87086 URINE CULTURE/COLONY COUNT: CPT

## 2022-11-04 PROCEDURE — 3074F SYST BP LT 130 MM HG: CPT

## 2022-11-04 PROCEDURE — 99214 OFFICE O/P EST MOD 30 MIN: CPT

## 2022-11-04 PROCEDURE — 3008F BODY MASS INDEX DOCD: CPT

## 2022-11-04 PROCEDURE — 3078F DIAST BP <80 MM HG: CPT

## 2022-11-04 RX ORDER — NITROFURANTOIN 25; 75 MG/1; MG/1
100 CAPSULE ORAL 2 TIMES DAILY
Qty: 10 CAPSULE | Refills: 0 | Status: SHIPPED | OUTPATIENT
Start: 2022-11-04 | End: 2022-11-09

## 2022-11-08 ENCOUNTER — TELEPHONE (OUTPATIENT)
Dept: INTERNAL MEDICINE CLINIC | Facility: CLINIC | Age: 62
End: 2022-11-08

## 2022-11-08 DIAGNOSIS — H57.9 EYE LESION: Primary | ICD-10-CM

## 2022-11-15 ENCOUNTER — TELEPHONE (OUTPATIENT)
Dept: INTERNAL MEDICINE CLINIC | Facility: CLINIC | Age: 62
End: 2022-11-15

## 2022-11-15 NOTE — TELEPHONE ENCOUNTER
Pt was taking levothroxine. Is she suppose to take this medication? Questions regarding white cells    Pt is feeling light headed.     Please call to discuss

## 2022-11-16 ENCOUNTER — HOSPITAL ENCOUNTER (OUTPATIENT)
Facility: HOSPITAL | Age: 62
Setting detail: HOSPITAL OUTPATIENT SURGERY
Discharge: HOME | End: 2022-11-16
Attending: INTERNAL MEDICINE | Admitting: INTERNAL MEDICINE
Payer: COMMERCIAL

## 2022-11-16 ENCOUNTER — ANESTHESIA EVENT (OUTPATIENT)
Dept: ENDOSCOPY | Facility: HOSPITAL | Age: 62
End: 2022-11-16
Payer: COMMERCIAL

## 2022-11-16 ENCOUNTER — ANESTHESIA (OUTPATIENT)
Dept: ENDOSCOPY | Facility: HOSPITAL | Age: 62
End: 2022-11-16
Payer: COMMERCIAL

## 2022-11-16 VITALS
HEART RATE: 50 BPM | RESPIRATION RATE: 16 BRPM | WEIGHT: 145 LBS | BODY MASS INDEX: 28.85 KG/M2 | HEIGHT: 59.5 IN | TEMPERATURE: 98 F | SYSTOLIC BLOOD PRESSURE: 114 MMHG | OXYGEN SATURATION: 100 % | DIASTOLIC BLOOD PRESSURE: 66 MMHG

## 2022-11-16 PROCEDURE — 88305 TISSUE EXAM BY PATHOLOGIST: CPT | Performed by: INTERNAL MEDICINE

## 2022-11-16 PROCEDURE — 0DBH8ZX EXCISION OF CECUM, VIA NATURAL OR ARTIFICIAL OPENING ENDOSCOPIC, DIAGNOSTIC: ICD-10-PCS | Performed by: INTERNAL MEDICINE

## 2022-11-16 PROCEDURE — 0DBK8ZX EXCISION OF ASCENDING COLON, VIA NATURAL OR ARTIFICIAL OPENING ENDOSCOPIC, DIAGNOSTIC: ICD-10-PCS | Performed by: INTERNAL MEDICINE

## 2022-11-16 PROCEDURE — 0DBM8ZX EXCISION OF DESCENDING COLON, VIA NATURAL OR ARTIFICIAL OPENING ENDOSCOPIC, DIAGNOSTIC: ICD-10-PCS | Performed by: INTERNAL MEDICINE

## 2022-11-16 RX ORDER — LIDOCAINE HYDROCHLORIDE 10 MG/ML
INJECTION, SOLUTION EPIDURAL; INFILTRATION; INTRACAUDAL; PERINEURAL AS NEEDED
Status: DISCONTINUED | OUTPATIENT
Start: 2022-11-16 | End: 2022-11-16 | Stop reason: SURG

## 2022-11-16 RX ORDER — SODIUM CHLORIDE, SODIUM LACTATE, POTASSIUM CHLORIDE, CALCIUM CHLORIDE 600; 310; 30; 20 MG/100ML; MG/100ML; MG/100ML; MG/100ML
INJECTION, SOLUTION INTRAVENOUS CONTINUOUS
Status: DISCONTINUED | OUTPATIENT
Start: 2022-11-16 | End: 2022-11-16

## 2022-11-16 RX ORDER — NALOXONE HYDROCHLORIDE 0.4 MG/ML
80 INJECTION, SOLUTION INTRAMUSCULAR; INTRAVENOUS; SUBCUTANEOUS AS NEEDED
Status: DISCONTINUED | OUTPATIENT
Start: 2022-11-16 | End: 2022-11-16

## 2022-11-16 RX ADMIN — LIDOCAINE HYDROCHLORIDE 10 MG: 10 INJECTION, SOLUTION EPIDURAL; INFILTRATION; INTRACAUDAL; PERINEURAL at 08:20:00

## 2022-11-16 RX ADMIN — SODIUM CHLORIDE, SODIUM LACTATE, POTASSIUM CHLORIDE, CALCIUM CHLORIDE: 600; 310; 30; 20 INJECTION, SOLUTION INTRAVENOUS at 08:32:00

## 2022-11-16 NOTE — OPERATIVE REPORT
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 Tiny ulcers in cecum and AC  2. Scarred appearing ileocecal valve and AC most consistent with crohn's colitis  3. Normal terminal ileum  4. Moderate sigmoid diverticulosis  PROCEDURE PERFORMED: Colonoscopy to cecum  SCOPE UTILIZED: Adult Olympus Colonoscope  SEDATION MEDICATIONS: MAC   CECAL WITHDRAWAL TIME= 10 mins  DURATION of CONSCIOUS SEDATION: deep sedation provided by anesthesiologist  PREPROCEDURE ASSESSMENT: The indication for this procedure is to assess for colitis. The patient was identified by myself and nursing staff in the exam room. Informed consent was obtained. The patient was seen in clinic and a full H&P was obtained. On brief physical examination, airway is patent. Chest is clear. Heart has regular rate and rhythm. Abdomen is soft, nontender with good bowel sounds. A medication list was taken by nursing today and reviewed by myself. The patient is an ASA grade 2. Due to the technical nature of the procedure, pathology of the anal area could be missed. PROCEDURE NOTE: The procedure was completed without difficulty. The patient tolerated the procedure well. The prep was good. The colonoscope was inserted through the anus and advanced to the level of the cecum with visualization and photo documentation of the appendix. A slow withdrawal of the colonoscope was performed as well as retroflexion in the rectum. In the ascending colon there was significant scarring seen. No active colitis was seen. Multiple biopsies were taken from here. Tiny 1-2 ulcers were seen in the ascending colon. A few more tiny ulcers were seen in the cecum. No significant scarring was seen in the cecum. No pseudopolyps were seen. The ileocecal valve was deformed significantly. Terminal ileum was entered for about 10 cm and no ulcerations were seen. Random biopsies from the right and left colon were taken. Moderate sigmoid diverticulosis was noted. No anorectal disease was seen. No polyps, masses or lesions were found throughout the colon. Small internal hemorrhoids were noted. There were no immediate complications. FINDINGS   1. Pattern of colitis is most suspicious for Crohn's colitis. Continue mesalamine at this time. RECOMMENDATIONS: Repeat colonoscopy in 5 years. DISCHARGE: The patient was given an after visit summary detailing the procedure, findings, recommendations, f/u plan and an updated medication list.   PREP Quality indicators:  Aronchick scale    EXCELLENT - small volume of clear liquid > 95% of mucosa see  GOOD  - clear liquid covering up to 25% of mucosa, but > 90% of mucosa seen  FAIR  - some semisolid stool could be suctioned but > 90% of mucosa seen  POOR  - semisolid stool could not be suctioned and < 90% of mucosal seen  INADEQUATE- repeat preparation needed      Thank you very much for the consultation. I really appreciate it.     Becca Correia MD

## 2022-11-16 NOTE — DISCHARGE INSTRUCTIONS
Home Care Instructions for Colonoscopy  with Sedation    Diet:  - Resume your regular diet as tolerated unless otherwise instructed. - Start with light meals to minimize bloating.  - Do not drink alcohol today. Medication:  - If you have questions about resuming your normal medications, please contact your Primary Care Physician. Activities:  - Take it easy today. Do not return to work today. - Do not drive today. - Do not operate any machinery today (including kitchen equipment). Colonoscopy:  - You may notice some rectal \"spotting\" (a little blood on the toilet tissue) for a day or two after the exam. This is normal.  - If you experience any rectal bleeding (not spotting), persistent tenderness or sharp severe abdominal pains, oral temperature over 100 degrees Fahrenheit, light-headedness or dizziness, or any other problems, contact your doctor. **If unable to reach your doctor, please go to the [facility] Emergency Room**    - Your referring physician will receive a full report of your examination.  - If you do not hear from your doctor's office within two weeks of your biopsy, please call them for your results.

## 2022-11-16 NOTE — ANESTHESIA POSTPROCEDURE EVALUATION
[address] [facility] Patient Status:  Hospital Outpatient Surgery   Age/Gender 63year old female MRN [id number]   Location [address] [address] Attending Taylor Wilder MD   Hosp Day # 0 PCP Abdias Bernstein MD       Anesthesia Post-op Note    COLONOSCOPY with biopsies    Procedure Summary     Date: 11/16/22 Room / Location: [facility] ENDOSCOPY 03 / [facility] ENDOSCOPY    Anesthesia Start: [address] Anesthesia Stop: 4560    Procedure: COLONOSCOPY with biopsies Diagnosis: (cecal ulcers, diverticulosis)    Surgeons: Taylor Wilder MD Anesthesiologist: Michael Allison MD    Anesthesia Type: MAC ASA Status: 2          Anesthesia Type: MAC    Vitals Value Taken Time   /75 11/16/22 0841   Temp 98 11/16/22 0841   Pulse 75 11/16/22 0841   Resp 12 11/16/22 0841   SpO2 99 11/16/22 0841       Patient Location: Endoscopy    Anesthesia Type: MAC    Airway Patency: patent    Postop Pain Control: adequate    Mental Status: mildly sedated but able to meaningfully participate in the post-anesthesia evaluation    Nausea/Vomiting: none    Cardiopulmonary/Hydration status: stable euvolemic    Complications: no apparent anesthesia related complications    Postop vital signs: stable    Dental Exam: Unchanged from Preop    Patient to be discharged home when criteria met.

## 2023-02-28 ENCOUNTER — OFFICE VISIT (OUTPATIENT)
Dept: INTERNAL MEDICINE CLINIC | Facility: CLINIC | Age: 63
End: 2023-02-28
Payer: COMMERCIAL

## 2023-02-28 VITALS
HEIGHT: 59 IN | BODY MASS INDEX: 30.24 KG/M2 | SYSTOLIC BLOOD PRESSURE: 118 MMHG | OXYGEN SATURATION: 98 % | HEART RATE: 70 BPM | TEMPERATURE: 98 F | RESPIRATION RATE: 19 BRPM | DIASTOLIC BLOOD PRESSURE: 70 MMHG | WEIGHT: 150 LBS

## 2023-02-28 DIAGNOSIS — Z12.31 ENCOUNTER FOR SCREENING MAMMOGRAM FOR MALIGNANT NEOPLASM OF BREAST: ICD-10-CM

## 2023-02-28 DIAGNOSIS — L20.9 ATOPIC DERMATITIS, UNSPECIFIED TYPE: ICD-10-CM

## 2023-02-28 DIAGNOSIS — Z00.00 ANNUAL PHYSICAL EXAM: Primary | ICD-10-CM

## 2023-02-28 DIAGNOSIS — M25.561 CHRONIC PAIN OF RIGHT KNEE: ICD-10-CM

## 2023-02-28 DIAGNOSIS — G89.29 CHRONIC PAIN OF RIGHT KNEE: ICD-10-CM

## 2023-02-28 DIAGNOSIS — Z00.00 ROUTINE GENERAL MEDICAL EXAMINATION AT A HEALTH CARE FACILITY: ICD-10-CM

## 2023-02-28 PROCEDURE — 3078F DIAST BP <80 MM HG: CPT | Performed by: INTERNAL MEDICINE

## 2023-02-28 PROCEDURE — 3074F SYST BP LT 130 MM HG: CPT | Performed by: INTERNAL MEDICINE

## 2023-02-28 PROCEDURE — 99396 PREV VISIT EST AGE 40-64: CPT | Performed by: INTERNAL MEDICINE

## 2023-02-28 PROCEDURE — 3008F BODY MASS INDEX DOCD: CPT | Performed by: INTERNAL MEDICINE

## 2023-02-28 PROCEDURE — G0438 PPPS, INITIAL VISIT: HCPCS | Performed by: INTERNAL MEDICINE

## 2023-02-28 RX ORDER — FLUTICASONE PROPIONATE 50 MCG
2 SPRAY, SUSPENSION (ML) NASAL DAILY
Qty: 3 EACH | Refills: 0 | Status: SHIPPED | OUTPATIENT
Start: 2023-02-28

## 2023-02-28 RX ORDER — FLUOCINONIDE 0.5 MG/G
OINTMENT TOPICAL
Qty: 30 G | Refills: 2 | Status: SHIPPED | OUTPATIENT
Start: 2023-02-28

## 2023-03-02 ENCOUNTER — TELEPHONE (OUTPATIENT)
Dept: ORTHOPEDICS CLINIC | Facility: CLINIC | Age: 63
End: 2023-03-02

## 2023-03-02 DIAGNOSIS — M25.561 RIGHT KNEE PAIN, UNSPECIFIED CHRONICITY: Primary | ICD-10-CM

## 2023-03-02 NOTE — TELEPHONE ENCOUNTER
Future Appointments   Date Time Provider Duke [place]   3/29/2023  9:45 AM BK [address] [facility] [address]   3/29/2023 10:00 AM BK BUNNY RM1 BK M[facility]   4/3/2023  3:10 PM NAP XR RM1 NAP XRAY [facility] [place]   4/3/2023  3:40 PM Mychal Constantino MD [facility] ORTHO [facility] [address]       Patient is scheduled for xray and advised to complete prior to appt

## 2023-03-02 NOTE — TELEPHONE ENCOUNTER
Patient scheduled with Dr. Mahi Rodriguez for a bakers cyst on her right knee. Please advise if imaging is needed.

## 2023-03-23 ENCOUNTER — OFFICE VISIT (OUTPATIENT)
Dept: INTERNAL MEDICINE CLINIC | Facility: CLINIC | Age: 63
End: 2023-03-23
Payer: COMMERCIAL

## 2023-03-23 VITALS
TEMPERATURE: 98 F | HEART RATE: 69 BPM | RESPIRATION RATE: 16 BRPM | DIASTOLIC BLOOD PRESSURE: 70 MMHG | BODY MASS INDEX: 29.84 KG/M2 | OXYGEN SATURATION: 99 % | SYSTOLIC BLOOD PRESSURE: 122 MMHG | HEIGHT: 59 IN | WEIGHT: 148 LBS

## 2023-03-23 DIAGNOSIS — L65.9 ALOPECIA: Primary | ICD-10-CM

## 2023-03-23 PROCEDURE — 99213 OFFICE O/P EST LOW 20 MIN: CPT | Performed by: INTERNAL MEDICINE

## 2023-03-23 PROCEDURE — 3074F SYST BP LT 130 MM HG: CPT | Performed by: INTERNAL MEDICINE

## 2023-03-23 PROCEDURE — 3008F BODY MASS INDEX DOCD: CPT | Performed by: INTERNAL MEDICINE

## 2023-03-23 PROCEDURE — 3078F DIAST BP <80 MM HG: CPT | Performed by: INTERNAL MEDICINE

## 2023-03-29 ENCOUNTER — HOSPITAL ENCOUNTER (OUTPATIENT)
Dept: MAMMOGRAPHY | Age: 63
Discharge: HOME | End: 2023-03-29
Attending: INTERNAL MEDICINE
Payer: COMMERCIAL

## 2023-03-29 ENCOUNTER — LABORATORY ENCOUNTER (OUTPATIENT)
Dept: LAB | Age: 63
End: 2023-03-29
Attending: INTERNAL MEDICINE
Payer: COMMERCIAL

## 2023-03-29 DIAGNOSIS — Z12.31 ENCOUNTER FOR SCREENING MAMMOGRAM FOR MALIGNANT NEOPLASM OF BREAST: ICD-10-CM

## 2023-03-29 DIAGNOSIS — Z00.00 ROUTINE GENERAL MEDICAL EXAMINATION AT A HEALTH CARE FACILITY: ICD-10-CM

## 2023-03-29 LAB
ALBUMIN SERPL-MCNC: 3.7 G/DL (ref 3.4–5)
ALBUMIN/GLOB SERPL: 1.1 {RATIO} (ref 1–2)
ALP LIVER SERPL-CCNC: 59 U/L
ALT SERPL-CCNC: 29 U/L
ANION GAP SERPL CALC-SCNC: 4 MMOL/L (ref 0–18)
AST SERPL-CCNC: 25 U/L (ref 15–37)
BASOPHILS # BLD AUTO: 0.07 X10(3) UL (ref 0–0.2)
BASOPHILS NFR BLD AUTO: 1.1 %
BILIRUB SERPL-MCNC: 0.4 MG/DL (ref 0.1–2)
BILIRUB UR QL STRIP.AUTO: NEGATIVE
BUN BLD-MCNC: 12 MG/DL (ref 7–18)
CALCIUM BLD-MCNC: 8.7 MG/DL (ref 8.5–10.1)
CHLORIDE SERPL-SCNC: 114 MMOL/L (ref 98–112)
CHOLEST SERPL-MCNC: 213 MG/DL (ref ?–200)
CLARITY UR REFRACT.AUTO: CLEAR
CO2 SERPL-SCNC: 25 MMOL/L (ref 21–32)
COLOR UR AUTO: YELLOW
CREAT BLD-MCNC: 0.84 MG/DL
EOSINOPHIL # BLD AUTO: 0.21 X10(3) UL (ref 0–0.7)
EOSINOPHIL NFR BLD AUTO: 3.3 %
ERYTHROCYTE [DISTWIDTH] IN BLOOD BY AUTOMATED COUNT: 13.2 %
EST. AVERAGE GLUCOSE BLD GHB EST-MCNC: 108 MG/DL (ref 68–126)
FASTING PATIENT LIPID ANSWER: YES
FASTING STATUS PATIENT QL REPORTED: YES
GFR SERPLBLD BASED ON 1.73 SQ M-ARVRAT: 80 ML/MIN/1.73M2 (ref 60–?)
GLOBULIN PLAS-MCNC: 3.4 G/DL (ref 2.8–4.4)
GLUCOSE BLD-MCNC: 89 MG/DL (ref 70–99)
GLUCOSE UR STRIP.AUTO-MCNC: NEGATIVE MG/DL
HBA1C MFR BLD: 5.4 % (ref ?–5.7)
HCT VFR BLD AUTO: 41.1 %
HDLC SERPL-MCNC: 64 MG/DL (ref 40–59)
HGB BLD-MCNC: 13 G/DL
IMM GRANULOCYTES # BLD AUTO: 0.01 X10(3) UL (ref 0–1)
IMM GRANULOCYTES NFR BLD: 0.2 %
KETONES UR STRIP.AUTO-MCNC: NEGATIVE MG/DL
LDLC SERPL CALC-MCNC: 139 MG/DL (ref ?–100)
LEUKOCYTE ESTERASE UR QL STRIP.AUTO: NEGATIVE
LYMPHOCYTES # BLD AUTO: 2.23 X10(3) UL (ref 1–4)
LYMPHOCYTES NFR BLD AUTO: 34.9 %
MCH RBC QN AUTO: 32.3 PG (ref 26–34)
MCHC RBC AUTO-ENTMCNC: 31.6 G/DL (ref 31–37)
MCV RBC AUTO: 102.2 FL
MONOCYTES # BLD AUTO: 0.6 X10(3) UL (ref 0.1–1)
MONOCYTES NFR BLD AUTO: 9.4 %
NEUTROPHILS # BLD AUTO: 3.27 X10 (3) UL (ref 1.5–7.7)
NEUTROPHILS # BLD AUTO: 3.27 X10(3) UL (ref 1.5–7.7)
NEUTROPHILS NFR BLD AUTO: 51.1 %
NITRITE UR QL STRIP.AUTO: NEGATIVE
NONHDLC SERPL-MCNC: 149 MG/DL (ref ?–130)
OSMOLALITY SERPL CALC.SUM OF ELEC: 295 MOSM/KG (ref 275–295)
PH UR STRIP.AUTO: 5 [PH] (ref 5–8)
PLATELET # BLD AUTO: 305 10(3)UL (ref 150–450)
POTASSIUM SERPL-SCNC: 3.9 MMOL/L (ref 3.5–5.1)
PROT SERPL-MCNC: 7.1 G/DL (ref 6.4–8.2)
PROT UR STRIP.AUTO-MCNC: NEGATIVE MG/DL
RBC # BLD AUTO: 4.02 X10(6)UL
RBC UR QL AUTO: NEGATIVE
SODIUM SERPL-SCNC: 143 MMOL/L (ref 136–145)
SP GR UR STRIP.AUTO: 1.02 (ref 1–1.03)
TRIGL SERPL-MCNC: 58 MG/DL (ref 30–149)
TSI SER-ACNC: 3.07 MIU/ML (ref 0.36–3.74)
UROBILINOGEN UR STRIP.AUTO-MCNC: <2 MG/DL
VLDLC SERPL CALC-MCNC: 11 MG/DL (ref 0–30)
WBC # BLD AUTO: 6.4 X10(3) UL (ref 4–11)

## 2023-03-29 PROCEDURE — 80053 COMPREHEN METABOLIC PANEL: CPT

## 2023-03-29 PROCEDURE — 80061 LIPID PANEL: CPT

## 2023-03-29 PROCEDURE — 83036 HEMOGLOBIN GLYCOSYLATED A1C: CPT

## 2023-03-29 PROCEDURE — 84443 ASSAY THYROID STIM HORMONE: CPT

## 2023-03-29 PROCEDURE — 77063 BREAST TOMOSYNTHESIS BI: CPT | Performed by: INTERNAL MEDICINE

## 2023-03-29 PROCEDURE — 36415 COLL VENOUS BLD VENIPUNCTURE: CPT

## 2023-03-29 PROCEDURE — 77067 SCR MAMMO BI INCL CAD: CPT | Performed by: INTERNAL MEDICINE

## 2023-03-29 PROCEDURE — 81003 URINALYSIS AUTO W/O SCOPE: CPT

## 2023-03-29 PROCEDURE — 85025 COMPLETE CBC W/AUTO DIFF WBC: CPT

## 2023-03-30 ENCOUNTER — OFFICE VISIT (OUTPATIENT)
Dept: DERMATOLOGY | Age: 63
End: 2023-03-30

## 2023-03-30 DIAGNOSIS — L65.0 TELOGEN EFFLUVIUM: Primary | ICD-10-CM

## 2023-03-30 PROCEDURE — 99203 OFFICE O/P NEW LOW 30 MIN: CPT | Performed by: DERMATOLOGY

## 2023-03-30 RX ORDER — LEVOTHYROXINE SODIUM 0.05 MG/1
TABLET ORAL
COMMUNITY
Start: 2023-02-24

## 2023-03-30 RX ORDER — LORATADINE 10 MG/1
TABLET ORAL DAILY
COMMUNITY

## 2023-03-30 RX ORDER — MESALAMINE 1.2 G/1
TABLET, DELAYED RELEASE ORAL
COMMUNITY
Start: 2023-03-27

## 2023-03-30 RX ORDER — MONTELUKAST SODIUM 10 MG/1
TABLET ORAL
COMMUNITY
Start: 2023-03-27

## 2023-03-30 RX ORDER — FLUOCINONIDE 0.5 MG/G
OINTMENT TOPICAL
COMMUNITY
Start: 2023-02-28 | End: 2023-06-29 | Stop reason: ALTCHOICE

## 2023-03-30 RX ORDER — ALBUTEROL SULFATE 90 UG/1
2 AEROSOL, METERED RESPIRATORY (INHALATION) EVERY 6 HOURS PRN
COMMUNITY

## 2023-03-30 RX ORDER — MINOXIDIL 2.5 MG/1
0.62 TABLET ORAL DAILY
Qty: 8 TABLET | Refills: 2 | Status: SHIPPED | OUTPATIENT
Start: 2023-03-30 | End: 2023-06-29 | Stop reason: SDUPTHER

## 2023-04-03 ENCOUNTER — OFFICE VISIT (OUTPATIENT)
Dept: ORTHOPEDICS CLINIC | Facility: CLINIC | Age: 63
End: 2023-04-03
Payer: COMMERCIAL

## 2023-04-03 ENCOUNTER — HOSPITAL ENCOUNTER (OUTPATIENT)
Dept: GENERAL RADIOLOGY | Age: 63
Discharge: HOME | End: 2023-04-03
Attending: ORTHOPAEDIC SURGERY
Payer: COMMERCIAL

## 2023-04-03 VITALS — OXYGEN SATURATION: 97 % | HEIGHT: 59.5 IN | BODY MASS INDEX: 29.44 KG/M2 | WEIGHT: 148 LBS | HEART RATE: 67 BPM

## 2023-04-03 DIAGNOSIS — M17.11 PRIMARY OSTEOARTHRITIS OF RIGHT KNEE: Primary | ICD-10-CM

## 2023-04-03 DIAGNOSIS — M25.561 RIGHT KNEE PAIN, UNSPECIFIED CHRONICITY: ICD-10-CM

## 2023-04-03 PROCEDURE — 73564 X-RAY EXAM KNEE 4 OR MORE: CPT | Performed by: ORTHOPAEDIC SURGERY

## 2023-04-03 RX ORDER — MELOXICAM 15 MG/1
15 TABLET ORAL DAILY PRN
Qty: 30 TABLET | Refills: 0 | Status: SHIPPED | OUTPATIENT
Start: 2023-04-03

## 2023-04-26 ENCOUNTER — TELEPHONE (OUTPATIENT)
Dept: DERMATOLOGY | Age: 63
End: 2023-04-26

## 2023-05-08 RX ORDER — MELOXICAM 15 MG/1
TABLET ORAL
Qty: 30 TABLET | Refills: 0 | Status: SHIPPED | OUTPATIENT
Start: 2023-05-08

## 2023-05-08 NOTE — TELEPHONE ENCOUNTER
Last OV: 04/03/23  Last refill date: 04/03/23     #/refills: 30/0  Upcoming appt: No future appointments.

## 2023-06-06 ENCOUNTER — LAB ENCOUNTER (OUTPATIENT)
Dept: LAB | Age: 63
End: 2023-06-06
Payer: COMMERCIAL

## 2023-06-06 ENCOUNTER — OFFICE VISIT (OUTPATIENT)
Dept: INTERNAL MEDICINE CLINIC | Facility: CLINIC | Age: 63
End: 2023-06-06
Payer: COMMERCIAL

## 2023-06-06 VITALS
WEIGHT: 144 LBS | SYSTOLIC BLOOD PRESSURE: 110 MMHG | OXYGEN SATURATION: 97 % | TEMPERATURE: 98 F | DIASTOLIC BLOOD PRESSURE: 62 MMHG | RESPIRATION RATE: 16 BRPM | HEART RATE: 78 BPM | BODY MASS INDEX: 29.03 KG/M2 | HEIGHT: 59 IN

## 2023-06-06 DIAGNOSIS — R23.3 SPONTANEOUS BRUISING: Primary | ICD-10-CM

## 2023-06-06 DIAGNOSIS — R23.3 SPONTANEOUS BRUISING: ICD-10-CM

## 2023-06-06 LAB
APTT PPP: 26.8 SECONDS (ref 23.3–35.6)
BASOPHILS # BLD AUTO: 0.03 X10(3) UL (ref 0–0.2)
BASOPHILS NFR BLD AUTO: 0.4 %
EOSINOPHIL # BLD AUTO: 0.11 X10(3) UL (ref 0–0.7)
EOSINOPHIL NFR BLD AUTO: 1.6 %
ERYTHROCYTE [DISTWIDTH] IN BLOOD BY AUTOMATED COUNT: 13.7 %
HCT VFR BLD AUTO: 42.4 %
HGB BLD-MCNC: 13.4 G/DL
IMM GRANULOCYTES # BLD AUTO: 0.01 X10(3) UL (ref 0–1)
IMM GRANULOCYTES NFR BLD: 0.1 %
INR BLD: 1.02 (ref 0.85–1.16)
LYMPHOCYTES # BLD AUTO: 1.57 X10(3) UL (ref 1–4)
LYMPHOCYTES NFR BLD AUTO: 23.4 %
MCH RBC QN AUTO: 32.3 PG (ref 26–34)
MCHC RBC AUTO-ENTMCNC: 31.6 G/DL (ref 31–37)
MCV RBC AUTO: 102.2 FL
MONOCYTES # BLD AUTO: 0.45 X10(3) UL (ref 0.1–1)
MONOCYTES NFR BLD AUTO: 6.7 %
NEUTROPHILS # BLD AUTO: 4.55 X10 (3) UL (ref 1.5–7.7)
NEUTROPHILS # BLD AUTO: 4.55 X10(3) UL (ref 1.5–7.7)
NEUTROPHILS NFR BLD AUTO: 67.8 %
PLATELET # BLD AUTO: 276 10(3)UL (ref 150–450)
PROTHROMBIN TIME: 13.4 SECONDS (ref 11.6–14.8)
RBC # BLD AUTO: 4.15 X10(6)UL
WBC # BLD AUTO: 6.7 X10(3) UL (ref 4–11)

## 2023-06-06 PROCEDURE — 3074F SYST BP LT 130 MM HG: CPT

## 2023-06-06 PROCEDURE — 3008F BODY MASS INDEX DOCD: CPT

## 2023-06-06 PROCEDURE — 85025 COMPLETE CBC W/AUTO DIFF WBC: CPT

## 2023-06-06 PROCEDURE — 85730 THROMBOPLASTIN TIME PARTIAL: CPT

## 2023-06-06 PROCEDURE — 36415 COLL VENOUS BLD VENIPUNCTURE: CPT

## 2023-06-06 PROCEDURE — 85610 PROTHROMBIN TIME: CPT

## 2023-06-06 PROCEDURE — 99213 OFFICE O/P EST LOW 20 MIN: CPT

## 2023-06-06 PROCEDURE — 3078F DIAST BP <80 MM HG: CPT

## 2023-06-07 DIAGNOSIS — J30.2 SEASONAL ALLERGIES: ICD-10-CM

## 2023-06-07 RX ORDER — MONTELUKAST SODIUM 10 MG/1
10 TABLET ORAL DAILY
Qty: 90 TABLET | Refills: 1 | Status: SHIPPED | OUTPATIENT
Start: 2023-06-07

## 2023-06-09 RX ORDER — MELOXICAM 15 MG/1
15 TABLET ORAL
Qty: 30 TABLET | Refills: 0 | Status: SHIPPED | OUTPATIENT
Start: 2023-06-09

## 2023-06-09 NOTE — TELEPHONE ENCOUNTER
Meloxicam  Last OV: 04/03/23  Last refill date: 05/08/23     #/refills: 30/0  Upcoming appt: No future appointments.

## 2023-06-09 NOTE — TELEPHONE ENCOUNTER
Richy Sibley PA-C  30-day supply given. Would not recommend 90 day supply to ensure we can more closely monitor renal function.

## 2023-06-29 ENCOUNTER — OFFICE VISIT (OUTPATIENT)
Dept: DERMATOLOGY | Age: 63
End: 2023-06-29

## 2023-06-29 DIAGNOSIS — L65.8 TRACTION ALOPECIA: Primary | ICD-10-CM

## 2023-06-29 PROCEDURE — 99213 OFFICE O/P EST LOW 20 MIN: CPT | Performed by: DERMATOLOGY

## 2023-06-29 RX ORDER — MINOXIDIL 2.5 MG/1
0.62 TABLET ORAL DAILY
Qty: 8 TABLET | Refills: 5 | Status: SHIPPED | OUTPATIENT
Start: 2023-06-29

## 2023-07-06 ENCOUNTER — TELEPHONE (OUTPATIENT)
Dept: FAMILY MEDICINE CLINIC | Facility: CLINIC | Age: 63
End: 2023-07-06

## 2023-07-17 DIAGNOSIS — E03.9 HYPOTHYROIDISM, UNSPECIFIED TYPE: ICD-10-CM

## 2023-07-18 RX ORDER — MELOXICAM 15 MG/1
15 TABLET ORAL
Qty: 30 TABLET | Refills: 0 | Status: SHIPPED | OUTPATIENT
Start: 2023-07-18

## 2023-07-18 RX ORDER — LEVOTHYROXINE SODIUM 0.05 MG/1
50 TABLET ORAL DAILY
Qty: 90 TABLET | Refills: 3 | Status: SHIPPED | OUTPATIENT
Start: 2023-07-18

## 2023-07-18 NOTE — TELEPHONE ENCOUNTER
Meloxicam    Last OV: 04/03/23  Last refill date: 06/09/23 #/refills: 30/0  Upcoming appt: No future appointments.

## 2023-11-07 ENCOUNTER — OFFICE VISIT (OUTPATIENT)
Dept: INTERNAL MEDICINE CLINIC | Facility: CLINIC | Age: 63
End: 2023-11-07
Payer: COMMERCIAL

## 2023-11-07 VITALS
SYSTOLIC BLOOD PRESSURE: 120 MMHG | TEMPERATURE: 98 F | RESPIRATION RATE: 16 BRPM | DIASTOLIC BLOOD PRESSURE: 80 MMHG | HEART RATE: 71 BPM | BODY MASS INDEX: 28.63 KG/M2 | WEIGHT: 142 LBS | HEIGHT: 59 IN | OXYGEN SATURATION: 99 %

## 2023-11-07 DIAGNOSIS — Z28.21 INFLUENZA VACCINATION DECLINED BY PATIENT: ICD-10-CM

## 2023-11-07 DIAGNOSIS — G89.29 CHRONIC PAIN OF RIGHT KNEE: Primary | ICD-10-CM

## 2023-11-07 DIAGNOSIS — M25.561 CHRONIC PAIN OF RIGHT KNEE: Primary | ICD-10-CM

## 2023-11-07 DIAGNOSIS — M17.11 PRIMARY OSTEOARTHRITIS OF RIGHT KNEE: ICD-10-CM

## 2023-11-07 PROCEDURE — 99213 OFFICE O/P EST LOW 20 MIN: CPT

## 2023-11-07 PROCEDURE — 3008F BODY MASS INDEX DOCD: CPT

## 2023-11-07 PROCEDURE — 3079F DIAST BP 80-89 MM HG: CPT

## 2023-11-07 PROCEDURE — 3074F SYST BP LT 130 MM HG: CPT

## 2023-11-07 RX ORDER — MELOXICAM 15 MG/1
15 TABLET ORAL
Qty: 30 TABLET | Refills: 0 | Status: SHIPPED | OUTPATIENT
Start: 2023-11-07

## 2023-11-07 RX ORDER — PREDNISONE 20 MG/1
40 TABLET ORAL DAILY
Qty: 10 TABLET | Refills: 0 | Status: SHIPPED | OUTPATIENT
Start: 2023-11-07 | End: 2023-11-12

## 2024-02-02 DIAGNOSIS — J30.2 SEASONAL ALLERGIES: ICD-10-CM

## 2024-02-02 RX ORDER — MONTELUKAST SODIUM 10 MG/1
10 TABLET ORAL DAILY
Qty: 90 TABLET | Refills: 0 | Status: SHIPPED | OUTPATIENT
Start: 2024-02-02

## 2024-02-02 NOTE — TELEPHONE ENCOUNTER
Last time medication was refilled 06/07/2023  Quantity and # of refills 90 w/ 1  Last OV 11/07/2023  Next OV   Future Appointments   Date Time Provider Department Center   2/5/2024 10:15 AM Rao Epsp MD [facility] [address]        Medication failed protocol.    Sent to MARANDA Camarena for approval

## 2024-02-05 ENCOUNTER — LAB ENCOUNTER (OUTPATIENT)
Dept: LAB | Age: 64
End: 2024-02-05
Attending: INTERNAL MEDICINE
Payer: COMMERCIAL

## 2024-02-05 ENCOUNTER — OFFICE VISIT (OUTPATIENT)
Dept: INTERNAL MEDICINE CLINIC | Facility: CLINIC | Age: 64
End: 2024-02-05
Payer: COMMERCIAL

## 2024-02-05 VITALS
HEIGHT: 59 IN | TEMPERATURE: 98 F | RESPIRATION RATE: 18 BRPM | OXYGEN SATURATION: 100 % | SYSTOLIC BLOOD PRESSURE: 110 MMHG | BODY MASS INDEX: 29.23 KG/M2 | HEART RATE: 80 BPM | DIASTOLIC BLOOD PRESSURE: 78 MMHG | WEIGHT: 145 LBS

## 2024-02-05 DIAGNOSIS — K52.9 COLITIS: ICD-10-CM

## 2024-02-05 DIAGNOSIS — Z00.00 ROUTINE GENERAL MEDICAL EXAMINATION AT A HEALTH CARE FACILITY: ICD-10-CM

## 2024-02-05 DIAGNOSIS — Z12.31 ENCOUNTER FOR SCREENING MAMMOGRAM FOR MALIGNANT NEOPLASM OF BREAST: ICD-10-CM

## 2024-02-05 DIAGNOSIS — Z00.00 ANNUAL PHYSICAL EXAM: Primary | ICD-10-CM

## 2024-02-05 DIAGNOSIS — L30.9 DERMATITIS: ICD-10-CM

## 2024-02-05 LAB
ALBUMIN SERPL-MCNC: 3.8 G/DL (ref 3.4–5)
ALBUMIN/GLOB SERPL: 1.1 {RATIO} (ref 1–2)
ALP LIVER SERPL-CCNC: 61 U/L
ALT SERPL-CCNC: 23 U/L
ANION GAP SERPL CALC-SCNC: 4 MMOL/L (ref 0–18)
AST SERPL-CCNC: 18 U/L (ref 15–37)
BASOPHILS # BLD AUTO: 0.06 X10(3) UL (ref 0–0.2)
BASOPHILS NFR BLD AUTO: 0.8 %
BILIRUB SERPL-MCNC: 0.6 MG/DL (ref 0.1–2)
BILIRUB UR QL STRIP.AUTO: NEGATIVE
BUN BLD-MCNC: 14 MG/DL (ref 9–23)
CALCIUM BLD-MCNC: 9.2 MG/DL (ref 8.5–10.1)
CHLORIDE SERPL-SCNC: 114 MMOL/L (ref 98–112)
CHOLEST SERPL-MCNC: 232 MG/DL (ref ?–200)
CLARITY UR REFRACT.AUTO: CLEAR
CO2 SERPL-SCNC: 24 MMOL/L (ref 21–32)
CREAT BLD-MCNC: 0.89 MG/DL
EGFRCR SERPLBLD CKD-EPI 2021: 74 ML/MIN/1.73M2 (ref 60–?)
EOSINOPHIL # BLD AUTO: 0.13 X10(3) UL (ref 0–0.7)
EOSINOPHIL NFR BLD AUTO: 1.7 %
ERYTHROCYTE [DISTWIDTH] IN BLOOD BY AUTOMATED COUNT: 12.9 %
FASTING PATIENT LIPID ANSWER: YES
FASTING STATUS PATIENT QL REPORTED: YES
GLOBULIN PLAS-MCNC: 3.5 G/DL (ref 2.8–4.4)
GLUCOSE BLD-MCNC: 97 MG/DL (ref 70–99)
GLUCOSE UR STRIP.AUTO-MCNC: NORMAL MG/DL
HCT VFR BLD AUTO: 40 %
HDLC SERPL-MCNC: 66 MG/DL (ref 40–59)
HGB BLD-MCNC: 13.3 G/DL
IMM GRANULOCYTES # BLD AUTO: 0.01 X10(3) UL (ref 0–1)
IMM GRANULOCYTES NFR BLD: 0.1 %
KETONES UR STRIP.AUTO-MCNC: NEGATIVE MG/DL
LDLC SERPL CALC-MCNC: 154 MG/DL (ref ?–100)
LEUKOCYTE ESTERASE UR QL STRIP.AUTO: 25
LYMPHOCYTES # BLD AUTO: 2.31 X10(3) UL (ref 1–4)
LYMPHOCYTES NFR BLD AUTO: 30.5 %
MCH RBC QN AUTO: 33.3 PG (ref 26–34)
MCHC RBC AUTO-ENTMCNC: 33.3 G/DL (ref 31–37)
MCV RBC AUTO: 100.3 FL
MONOCYTES # BLD AUTO: 0.47 X10(3) UL (ref 0.1–1)
MONOCYTES NFR BLD AUTO: 6.2 %
NEUTROPHILS # BLD AUTO: 4.59 X10 (3) UL (ref 1.5–7.7)
NEUTROPHILS # BLD AUTO: 4.59 X10(3) UL (ref 1.5–7.7)
NEUTROPHILS NFR BLD AUTO: 60.7 %
NITRITE UR QL STRIP.AUTO: NEGATIVE
NONHDLC SERPL-MCNC: 166 MG/DL (ref ?–130)
OSMOLALITY SERPL CALC.SUM OF ELEC: 294 MOSM/KG (ref 275–295)
PH UR STRIP.AUTO: 6 [PH] (ref 5–8)
PLATELET # BLD AUTO: 291 10(3)UL (ref 150–450)
POTASSIUM SERPL-SCNC: 4.2 MMOL/L (ref 3.5–5.1)
PROT SERPL-MCNC: 7.3 G/DL (ref 6.4–8.2)
PROT UR STRIP.AUTO-MCNC: NEGATIVE MG/DL
RBC # BLD AUTO: 3.99 X10(6)UL
RBC UR QL AUTO: NEGATIVE
SODIUM SERPL-SCNC: 142 MMOL/L (ref 136–145)
SP GR UR STRIP.AUTO: 1.02 (ref 1–1.03)
TRIGL SERPL-MCNC: 70 MG/DL (ref 30–149)
TSI SER-ACNC: 1.51 MIU/ML (ref 0.36–3.74)
UROBILINOGEN UR STRIP.AUTO-MCNC: NORMAL MG/DL
VLDLC SERPL CALC-MCNC: 13 MG/DL (ref 0–30)
WBC # BLD AUTO: 7.6 X10(3) UL (ref 4–11)

## 2024-02-05 PROCEDURE — 85025 COMPLETE CBC W/AUTO DIFF WBC: CPT

## 2024-02-05 PROCEDURE — 81001 URINALYSIS AUTO W/SCOPE: CPT

## 2024-02-05 PROCEDURE — 36415 COLL VENOUS BLD VENIPUNCTURE: CPT

## 2024-02-05 PROCEDURE — 80061 LIPID PANEL: CPT

## 2024-02-05 PROCEDURE — 80053 COMPREHEN METABOLIC PANEL: CPT

## 2024-02-05 PROCEDURE — 84443 ASSAY THYROID STIM HORMONE: CPT

## 2024-02-05 NOTE — PROGRESS NOTES
Subjective:   Patient ID: Nurys Oneill is a 63 year old female.    HPI  HPI:   Nurys Oneill is a 63 year old female who presents for a complete physical exam. Symptoms: denies discharge, itching, burning or dysuria, is menopausal. Patient complains of recurrent sxs similar to her episodes of colitis. She would like to see her GI specialist for eval.     She would skin check as well    PAST MEDICAL, SOCIAL, FAMILY HISTORIES REVIEWED WITH PT      Immunization History   Administered Date(s) Administered    Covid-19 Vaccine Pfizer 30 mcg/0.3 ml 04/19/2021, 05/11/2021, 11/19/2021    HEP B 03/21/2000, 05/01/2000    TDAP 09/30/2019     Wt Readings from Last 6 Encounters:   02/05/24 145 lb (65.8 kg)   11/07/23 142 lb (64.4 kg)   06/06/23 144 lb (65.3 kg)   04/03/23 148 lb (67.1 kg)   03/29/23 147 lb (66.7 kg)   03/23/23 148 lb (67.1 kg)     Body mass index is 29.29 kg/m².     Lab Results   Component Value Date    GLU 89 03/29/2023    GLU 86 02/12/2022    GLU 79 01/11/2021     Lab Results   Component Value Date    CHOLEST 213 (H) 03/29/2023    CHOLEST 229 (H) 02/12/2022    CHOLEST 218 (H) 05/08/2021     Lab Results   Component Value Date    HDL 64 (H) 03/29/2023    HDL 59 02/12/2022    HDL 59 05/08/2021     Lab Results   Component Value Date     (H) 03/29/2023     (H) 02/12/2022     (H) 05/08/2021     Lab Results   Component Value Date    AST 25 03/29/2023    AST 18 02/12/2022    AST 22 01/11/2021     Lab Results   Component Value Date    ALT 29 03/29/2023    ALT 22 02/12/2022    ALT 24 01/11/2021       Current Outpatient Medications   Medication Sig Dispense Refill    montelukast 10 MG Oral Tab Take 1 tablet (10 mg total) by mouth daily. 90 tablet 0    Meloxicam 15 MG Oral Tab Take 1 tablet (15 mg total) by mouth daily as needed for Pain. 30 tablet 0    LEVOTHYROXINE 50 MCG Oral Tab TAKE 1 TABLET(50 MCG) BY MOUTH DAILY 90 tablet 3    fluocinonide 0.05 % External Ointment Apply to affected area  twice daily 30 g 2    fluticasone propionate 50 MCG/ACT Nasal Suspension 2 sprays by Each Nare route daily. 3 each 0    olopatadine 0.1 % Ophthalmic Solution Apply 1 drop to eye 2 (two) times daily. INSTILL 1 DROP IN RIGHT EYE TWICE DAILY 5 mL 0    Multiple Vitamins-Minerals (MULTIVITAMIN ADULT OR) every other day.        mesalamine 1.2 g Oral Tab EC Take 2 tablets (2.4 g total) by mouth daily. 60 tablet 11      Past Medical History:   Diagnosis Date    Allergic rhinitis     Visual impairment     glasses      Past Surgical History:   Procedure Laterality Date      1987    COLONOSCOPY N/A 5/10/2021    Procedure: COLONOSCOPY with biopsies;  Surgeon: Anand Dobbins DO;  Location:  ENDOSCOPY    COLONOSCOPY N/A 2022    Procedure: COLONOSCOPY with biopsies;  Surgeon: Hardik Riggs MD;  Location:  ENDOSCOPY    HYSTERECTOMY      DUB, fibroids    BUNNY LOCALIZATION WIRE 1 SITE RIGHT (CPT=19281) Right     benign cyst removed, 3 sites    OOPHORECTOMY Bilateral 2015    total hystero.      Family History   Problem Relation Age of Onset    Cancer Neg     Diabetes Neg     Hypertension Neg     Lipids Neg     Heart Disorder Neg     Stroke Neg       Social History:   Social History     Socioeconomic History    Marital status:    Tobacco Use    Smoking status: Never    Smokeless tobacco: Never    Tobacco comments:     non-smoker   Vaping Use    Vaping Use: Never used   Substance and Sexual Activity    Alcohol use: Yes     Comment: occasionally    Drug use: No     O.   Exercise: once per week,  twice per week.  Diet: watches fats closely and watches sugar closely     REVIEW OF SYSTEMS:   A comprehensive 10 point review of systems was completed.     Pertinent positives and negatives noted in the HPI.      EXAM:   /78   Pulse 80   Temp 97.9 °F (36.6 °C)   Resp 18   Ht 4' 11\" (1.499 m)   Wt 145 lb (65.8 kg)   SpO2 100%   BMI 29.29 kg/m²   Body mass index is 29.29 kg/m².    GENERAL: well developed, well nourished,in no apparent distress  SKIN: no rashes,no suspicious lesions  HEENT: atraumatic, normocephalic,ears and throat are clear  EYES:PERRLA, EOMI, conjunctiva are clear  NECK: supple,no adenopathy,no bruits  LUNGS: clear to auscultation  CARDIO: RRR without murmur  GI: good BS's,no masses, HSM or tenderness  :deferred  MUSCULOSKELETAL: back is not tender  EXTREMITIES: no cyanosis, clubbing or edema  NEURO: Oriented times three,motor and sensory are grossly intact    ASSESSMENT AND PLAN:   Nurys Oneill is a 63 year old female who presents for a complete physical exam.  Pap and pelvic done by gyne. Order put in for mammogram a. Health maintenance, will check fasting Lipids, CMP, and CBC.  utd with screening colonoscopy.   Cont current meds  Pt info handouts given for: exercise, low fat diet    Body mass index is 29.29 kg/m²., recommended low fat diet and aerobic exercise 30 minutes three times weekly.    The patient indicates understanding of these issues and agrees to the plan.  The patient is asked to return for CPX in 12 m.    History/Other:   Review of Systems  Current Outpatient Medications   Medication Sig Dispense Refill    montelukast 10 MG Oral Tab Take 1 tablet (10 mg total) by mouth daily. 90 tablet 0    Meloxicam 15 MG Oral Tab Take 1 tablet (15 mg total) by mouth daily as needed for Pain. 30 tablet 0    LEVOTHYROXINE 50 MCG Oral Tab TAKE 1 TABLET(50 MCG) BY MOUTH DAILY 90 tablet 3    fluocinonide 0.05 % External Ointment Apply to affected area twice daily 30 g 2    fluticasone propionate 50 MCG/ACT Nasal Suspension 2 sprays by Each Nare route daily. 3 each 0    olopatadine 0.1 % Ophthalmic Solution Apply 1 drop to eye 2 (two) times daily. INSTILL 1 DROP IN RIGHT EYE TWICE DAILY 5 mL 0    Multiple Vitamins-Minerals (MULTIVITAMIN ADULT OR) every other day.        mesalamine 1.2 g Oral Tab EC Take 2 tablets (2.4 g total) by mouth daily. 60 tablet 11      Allergies:  Allergies   Allergen Reactions    Ciprofloxacin HIVES    Latex HIVES       Objective:   Physical Exam    Assessment & Plan:   1. Annual physical exam    2. Routine general medical examination at a health care facility    3. Encounter for screening mammogram for malignant neoplasm of breast    4. Colitis    5. Dermatitis        Orders Placed This Encounter   Procedures    CBC With Differential With Platelet    Comp Metabolic Panel (14)    Lipid Panel    TSH W Reflex To Free T4    Urinalysis, Routine       Meds This Visit:  Requested Prescriptions      No prescriptions requested or ordered in this encounter       Imaging & Referrals:  GASTRO - INTERNAL  DERM - INTERNAL  BUNNY JOVON 2D+3D SCREENING BILAT (CPT=77067/67726)

## 2024-02-07 ENCOUNTER — TELEPHONE (OUTPATIENT)
Dept: INTERNAL MEDICINE CLINIC | Facility: CLINIC | Age: 64
End: 2024-02-07

## 2024-02-07 NOTE — TELEPHONE ENCOUNTER
Patient is calling stating that she needs help find a GI doctor in her network. Was directed from her Insurance company to give the clinic a call to help her find one under her network. Patient would like a callback.

## 2024-02-07 NOTE — TELEPHONE ENCOUNTER
Patient states she contacted her GI provider, Dr. Riggs's office, and was notified that her insurance is not accepted. Provider listed on [place] [facility] website. Referral already authorized. Message sent to [facility] team to verify if this provider is within network.    Confirmed with [facility] team provider is on [facility] network list. Authorized referral and face sheet faxed to [facility].     Attempted to contact patient x 2 to notify of above. Phone answers but no one on other line. Tried twice. [employer] message sent to patient.

## 2024-02-11 NOTE — TELEPHONE ENCOUNTER
Group Topic:  Therapeutic Activity    Date: 2/11/2024  Start Time: 1445  End Time: 1530  Facilitators: Kelby Evans OT    Focus: Therapeutic activity  Number in attendance: 7    Method: Group  Attendance: Present  Participation: Active  Patient Response: Attentive  Mood: Normal  Affect: Type: Euthymic (normal mood)             Range: Full (normal)             Congruency: Congruent             Stability: Stable  Behavior/Socialization: Appropriate to group  Thought Process: Tracking  Task Performance: Follows directions  Patient Evaluation: Independent - full participation   LMTCB

## 2024-02-19 ENCOUNTER — TELEPHONE (OUTPATIENT)
Dept: INTERNAL MEDICINE CLINIC | Facility: CLINIC | Age: 64
End: 2024-02-19

## 2024-02-19 DIAGNOSIS — K52.9 COLITIS: Primary | ICD-10-CM

## 2024-02-19 NOTE — TELEPHONE ENCOUNTER
Pt has appt with Dr. Riggs tomorrow and she was contacted by his office today stating they are no longer in network with her insurance.  Pt was given below contact information. She will call and schedule appt.  She will call office or send a myc message with who she will be seeing and referral will be placed.  If appt is with NP/PA she will obtain who their supervising MD is.     [facility] [place] [address] [address]  [place] [id number] [phone]

## 2024-03-07 NOTE — PROGRESS NOTES
Nurys Oneill is a 63 year old female.  HPI:   Knee Pain   This is a recurrent problem. The current episode started more than 1 year ago. There has been no history of extremity trauma. The problem occurs constantly. The problem has been gradually worsening.    Pt has seen ortho for the pain above. Was started on Meloxicam daily. If it does note improve was recommended to return for possible steroid injection.      Current Outpatient Medications   Medication Sig Dispense Refill    methylPREDNISolone 4 MG Oral Tablet Therapy Pack Take as directed 21 tablet 0    montelukast 10 MG Oral Tab Take 1 tablet (10 mg total) by mouth daily. 90 tablet 0    Meloxicam 15 MG Oral Tab Take 1 tablet (15 mg total) by mouth daily as needed for Pain. 30 tablet 0    LEVOTHYROXINE 50 MCG Oral Tab TAKE 1 TABLET(50 MCG) BY MOUTH DAILY 90 tablet 3    fluocinonide 0.05 % External Ointment Apply to affected area twice daily 30 g 2    fluticasone propionate 50 MCG/ACT Nasal Suspension 2 sprays by Each Nare route daily. 3 each 0    olopatadine 0.1 % Ophthalmic Solution Apply 1 drop to eye 2 (two) times daily. INSTILL 1 DROP IN RIGHT EYE TWICE DAILY 5 mL 0    Multiple Vitamins-Minerals (MULTIVITAMIN ADULT OR) every other day.        mesalamine 1.2 g Oral Tab EC Take 2 tablets (2.4 g total) by mouth daily. 60 tablet 11      Past Medical History:   Diagnosis Date    Allergic rhinitis     Visual impairment     glasses      Social History:  Social History     Socioeconomic History    Marital status:    Tobacco Use    Smoking status: Never    Smokeless tobacco: Never    Tobacco comments:     non-smoker   Vaping Use    Vaping Use: Never used   Substance and Sexual Activity    Alcohol use: Yes     Comment: occasionally    Drug use: No        REVIEW OF SYSTEMS:   Review of Systems   Constitutional: Negative.    Respiratory: Negative.     Cardiovascular: Negative.    Musculoskeletal:  Positive for arthralgias. Negative for gait problem and  joint swelling.   Neurological:  Negative for tremors and weakness.   Psychiatric/Behavioral: Negative.           EXAM:   /78   Pulse 57   Temp 96.9 °F (36.1 °C)   Resp 16   Ht 4' 11\" (1.499 m)   Wt 146 lb (66.2 kg)   SpO2 98%   BMI 29.49 kg/m²   Physical Exam  Vitals and nursing note reviewed.   Constitutional:       Appearance: Normal appearance. She is normal weight.   Cardiovascular:      Rate and Rhythm: Normal rate and regular rhythm.      Pulses: Normal pulses.      Heart sounds: Normal heart sounds.   Pulmonary:      Effort: Pulmonary effort is normal.      Breath sounds: Normal breath sounds.   Musculoskeletal:      Right knee: No swelling, effusion, ecchymosis or bony tenderness. Normal range of motion. Tenderness (posterior knee) present. No LCL laxity, MCL laxity, ACL laxity or PCL laxity. Normal alignment, normal meniscus and normal patellar mobility. Normal pulse.      Instability Tests: Anterior drawer test negative. Posterior drawer test negative. Anterior Lachman test negative. Medial Darien test negative and lateral Darien test negative.   Skin:     General: Skin is warm and dry.      Capillary Refill: Capillary refill takes less than 2 seconds.   Neurological:      General: No focal deficit present.      Mental Status: She is alert and oriented to person, place, and time. Mental status is at baseline.   Psychiatric:         Mood and Affect: Mood normal.         Behavior: Behavior normal.         Thought Content: Thought content normal.          ASSESSMENT AND PLAN:   Diagnoses and all orders for this visit:    Chronic pain of right knee  Primary osteoarthritis of right knee  -     methylPREDNISolone 4 MG Oral Tablet Therapy Pack; Take as directed  -     Ortho Referral - In Network     Requested Prescriptions     Signed Prescriptions Disp Refills    methylPREDNISolone 4 MG Oral Tablet Therapy Pack 21 tablet 0     Sig: Take as directed         The patient indicates understanding of  these issues and agrees to the plan.  The patient is asked to return if symptoms persist or worsen.

## 2024-03-08 ENCOUNTER — OFFICE VISIT (OUTPATIENT)
Dept: INTERNAL MEDICINE CLINIC | Facility: CLINIC | Age: 64
End: 2024-03-08
Payer: COMMERCIAL

## 2024-03-08 VITALS
HEIGHT: 59 IN | OXYGEN SATURATION: 98 % | SYSTOLIC BLOOD PRESSURE: 118 MMHG | RESPIRATION RATE: 16 BRPM | HEART RATE: 57 BPM | WEIGHT: 146 LBS | BODY MASS INDEX: 29.43 KG/M2 | DIASTOLIC BLOOD PRESSURE: 78 MMHG | TEMPERATURE: 97 F

## 2024-03-08 DIAGNOSIS — M25.561 CHRONIC PAIN OF RIGHT KNEE: Primary | ICD-10-CM

## 2024-03-08 DIAGNOSIS — G89.29 CHRONIC PAIN OF RIGHT KNEE: Primary | ICD-10-CM

## 2024-03-08 DIAGNOSIS — M17.11 PRIMARY OSTEOARTHRITIS OF RIGHT KNEE: ICD-10-CM

## 2024-03-08 PROCEDURE — 99213 OFFICE O/P EST LOW 20 MIN: CPT

## 2024-03-08 RX ORDER — METHYLPREDNISOLONE 4 MG/1
TABLET ORAL
Qty: 21 TABLET | Refills: 0 | Status: SHIPPED | OUTPATIENT
Start: 2024-03-08

## 2024-03-20 ENCOUNTER — OFFICE VISIT (OUTPATIENT)
Dept: INTERNAL MEDICINE CLINIC | Facility: CLINIC | Age: 64
End: 2024-03-20
Payer: COMMERCIAL

## 2024-03-20 ENCOUNTER — MED REC SCAN ONLY (OUTPATIENT)
Dept: INTERNAL MEDICINE CLINIC | Facility: CLINIC | Age: 64
End: 2024-03-20

## 2024-03-20 VITALS
DIASTOLIC BLOOD PRESSURE: 68 MMHG | BODY MASS INDEX: 29.03 KG/M2 | RESPIRATION RATE: 16 BRPM | HEIGHT: 59 IN | SYSTOLIC BLOOD PRESSURE: 110 MMHG | WEIGHT: 144 LBS | HEART RATE: 62 BPM | OXYGEN SATURATION: 98 %

## 2024-03-20 DIAGNOSIS — S06.0X0A CONCUSSION WITHOUT LOSS OF CONSCIOUSNESS, INITIAL ENCOUNTER: ICD-10-CM

## 2024-03-20 DIAGNOSIS — L20.9 ATOPIC DERMATITIS, UNSPECIFIED TYPE: ICD-10-CM
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
## 2024-03-20 DIAGNOSIS — M54.6 ACUTE RIGHT-SIDED THORACIC BACK PAIN: ICD-10-CM

## 2024-03-20 DIAGNOSIS — M54.2 ACUTE NECK PAIN: ICD-10-CM

## 2024-03-20 PROCEDURE — 99214 OFFICE O/P EST MOD 30 MIN: CPT

## 2024-03-20 RX ORDER — CYCLOBENZAPRINE HCL 10 MG
10 TABLET ORAL 3 TIMES DAILY PRN
Qty: 20 TABLET | Refills: 0 | Status: SHIPPED | OUTPATIENT
Start: 2024-03-20

## 2024-03-20 RX ORDER — FLUOCINONIDE 0.5 MG/G
OINTMENT TOPICAL
Qty: 30 G | Refills: 2 | Status: SHIPPED | OUTPATIENT
Start: 2024-03-20

## 2024-03-20 RX ORDER — PREDNISONE 20 MG/1
40 TABLET ORAL DAILY
Qty: 10 TABLET | Refills: 0 | Status: SHIPPED | OUTPATIENT
Start: 2024-03-20 | End: 2024-03-25
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
## 2024-03-20 NOTE — PATIENT INSTRUCTIONS
Complete the prednisone therapy as prescribed  Take muscle relaxant as needed up to three times per day. No driving.  Could take Tylenol extra strength up to 1000 mg ever 8 hours. Do not exceed 3000mg per day.

## 2024-03-28 ENCOUNTER — HOSPITAL ENCOUNTER (OUTPATIENT)
Dept: GENERAL RADIOLOGY | Age: 64
Discharge: HOME | End: 2024-03-28
Payer: COMMERCIAL

## 2024-03-28 DIAGNOSIS — M54.6 ACUTE RIGHT-SIDED THORACIC BACK PAIN: ICD-10-CM

## 2024-03-28 DIAGNOSIS — M54.2 ACUTE NECK PAIN: ICD-10-CM
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
## 2024-03-28 PROCEDURE — 72050 X-RAY EXAM NECK SPINE 4/5VWS: CPT

## 2024-03-28 PROCEDURE — 72072 X-RAY EXAM THORAC SPINE 3VWS: CPT
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
## 2024-04-12 ENCOUNTER — OFFICE VISIT (OUTPATIENT)
Dept: INTERNAL MEDICINE CLINIC | Facility: CLINIC | Age: 64
End: 2024-04-12
Payer: COMMERCIAL

## 2024-04-12 VITALS
RESPIRATION RATE: 14 BRPM | BODY MASS INDEX: 28.22 KG/M2 | HEIGHT: 59 IN | TEMPERATURE: 97 F | HEART RATE: 86 BPM | WEIGHT: 140 LBS | DIASTOLIC BLOOD PRESSURE: 66 MMHG | SYSTOLIC BLOOD PRESSURE: 110 MMHG | OXYGEN SATURATION: 97 %
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
## 2024-04-12 DIAGNOSIS — M54.2 ACUTE NECK PAIN: ICD-10-CM

## 2024-04-12 DIAGNOSIS — L20.9 ATOPIC DERMATITIS, UNSPECIFIED TYPE: ICD-10-CM

## 2024-04-12 DIAGNOSIS — M54.6 ACUTE RIGHT-SIDED THORACIC BACK PAIN: ICD-10-CM

## 2024-04-12 RX ORDER — ASCORBIC ACID 100 MG
TABLET,CHEWABLE ORAL AS DIRECTED
COMMUNITY

## 2024-04-12 RX ORDER — CALCIUM CARBONATE 260MG(650)
TABLET,CHEWABLE ORAL
COMMUNITY

## 2024-04-25 ENCOUNTER — HOSPITAL ENCOUNTER (OUTPATIENT)
Dept: MAMMOGRAPHY | Age: 64
End: 2024-04-25
Attending: INTERNAL MEDICINE
Payer: COMMERCIAL

## 2024-04-25 ENCOUNTER — HOSPITAL ENCOUNTER (OUTPATIENT)
Dept: MAMMOGRAPHY | Age: 64
Discharge: HOME | End: 2024-04-25
Attending: INTERNAL MEDICINE
Payer: COMMERCIAL

## 2024-04-25 DIAGNOSIS — Z12.31 ENCOUNTER FOR SCREENING MAMMOGRAM FOR MALIGNANT NEOPLASM OF BREAST: ICD-10-CM

## 2024-04-25 PROCEDURE — 77067 SCR MAMMO BI INCL CAD: CPT | Performed by: INTERNAL MEDICINE

## 2024-04-25 PROCEDURE — 77063 BREAST TOMOSYNTHESIS BI: CPT | Performed by: INTERNAL MEDICINE

## 2024-06-17 ENCOUNTER — TELEPHONE (OUTPATIENT)
Dept: INTERNAL MEDICINE CLINIC | Facility: CLINIC | Age: 64
End: 2024-06-17

## 2024-06-17 DIAGNOSIS — Z12.83 SKIN EXAM FOR MALIGNANT NEOPLASM: Primary | ICD-10-CM

## 2024-06-17 DIAGNOSIS — R23.9 SKIN CHANGE: ICD-10-CM

## 2024-06-17 NOTE — TELEPHONE ENCOUNTER
PATIENT WANTS REFERRAL FOR DERMATOLOGIST WITH LUISA ALLEN   [address], [place] [address]    PHONE: [phone]  FAX: NO FAX GIVEN BY PATIENT OR WEBSITE    PATIENT HAS APPOINTMENT ON 6.19.24 WITH HIM

## 2024-07-23 NOTE — PROGRESS NOTES
Nurys Oneill is a 63 year old female.  HPI:   Knee Pain        Pt has seen ortho for the pain above. Was started on Meloxicam daily. If it does note improve was recommended to return for possible steroid injection. Pt did get relief with medrol dose pack during the last flare up.    Current Outpatient Medications   Medication Sig Dispense Refill    Ascorbic Acid (VITAMIN C) 100 MG Oral Chew Tab As Directed.      Zinc 10 MG Mouth/Throat Lozenge       fluocinonide 0.05 % External Ointment Apply to affected area twice daily 30 g 2    cyclobenzaprine 10 MG Oral Tab Take 1 tablet (10 mg total) by mouth 3 (three) times daily as needed for Muscle spasms. 20 tablet 0    methylPREDNISolone 4 MG Oral Tablet Therapy Pack Take as directed (Patient not taking: Reported on 4/12/2024) 21 tablet 0    montelukast 10 MG Oral Tab Take 1 tablet (10 mg total) by mouth daily. 90 tablet 0    Meloxicam 15 MG Oral Tab Take 1 tablet (15 mg total) by mouth daily as needed for Pain. 30 tablet 0    LEVOTHYROXINE 50 MCG Oral Tab TAKE 1 TABLET(50 MCG) BY MOUTH DAILY 90 tablet 3    fluticasone propionate 50 MCG/ACT Nasal Suspension 2 sprays by Each Nare route daily. 3 each 0    olopatadine 0.1 % Ophthalmic Solution Apply 1 drop to eye 2 (two) times daily. INSTILL 1 DROP IN RIGHT EYE TWICE DAILY 5 mL 0    mesalamine 1.2 g Oral Tab EC Take 2 tablets (2.4 g total) by mouth daily. 60 tablet 11    Multiple Vitamins-Minerals (MULTIVITAMIN ADULT OR) every other day.          Past Medical History:    Allergic rhinitis    Visual impairment    glasses      Social History:  Social History     Socioeconomic History    Marital status:    Tobacco Use    Smoking status: Never    Smokeless tobacco: Never    Tobacco comments:     non-smoker   Vaping Use    Vaping status: Never Used   Substance and Sexual Activity    Alcohol use: Yes     Comment: occasionally    Drug use: No        REVIEW OF SYSTEMS:   Review of Systems      EXAM:   There were no vitals  taken for this visit.  Physical Exam     ASSESSMENT AND PLAN:   There are no diagnoses linked to this encounter.     ***  Requested Prescriptions      No prescriptions requested or ordered in this encounter         The patient indicates understanding of these issues and agrees to the plan.  The patient is asked to return in ***.

## 2024-07-24 ENCOUNTER — APPOINTMENT (OUTPATIENT)
Dept: INTERNAL MEDICINE CLINIC | Facility: CLINIC | Age: 64
End: 2024-07-24
Payer: COMMERCIAL

## 2024-08-14 ENCOUNTER — TELEPHONE (OUTPATIENT)
Dept: INTERNAL MEDICINE CLINIC | Facility: CLINIC | Age: 64
End: 2024-08-14

## 2024-08-14 DIAGNOSIS — K52.9 COLITIS: ICD-10-CM

## 2024-08-14 RX ORDER — MESALAMINE 1.2 G/1
2.4 TABLET, DELAYED RELEASE ORAL DAILY
Qty: 60 TABLET | Refills: 0 | Status: SHIPPED | OUTPATIENT
Start: 2024-08-14 | End: 2025-08-14

## 2024-08-14 NOTE — TELEPHONE ENCOUNTER
Patient would like new GI referral recommendation as prior no longer accepts insurance. [facility] and informed Dr. Epps recommends all specialists at this practice. Patient asking if Dr. Epps can fill a short term supply of Mesalamine until she can be seen by GI as prior GI specialist will not fill medication. Per Dr. Epps okay to send a thirty day supply of medication.

## 2024-08-14 NOTE — TELEPHONE ENCOUNTER
PATIENT WANTS GASTRO REFERRAL     PATIENT WANTS TO SEE THE GASTRO DOCTOR SHE SAW BEFORE   DOESN'T KNOW NAME

## 2024-09-22 DIAGNOSIS — K52.9 COLITIS: ICD-10-CM

## 2024-09-23 RX ORDER — MESALAMINE 1.2 G/1
2.4 TABLET, DELAYED RELEASE ORAL DAILY
Qty: 60 TABLET | Refills: 0 | Status: SHIPPED | OUTPATIENT
Start: 2024-09-23

## 2024-09-23 NOTE — TELEPHONE ENCOUNTER
Last time medication was refilled 08/14/2024  Last office visit  04/12/2024  Next office visit due/scheduled No Future Appointments    Medication not on protocol.

## 2024-10-09 ENCOUNTER — OFFICE VISIT (OUTPATIENT)
Dept: OBGYN CLINIC | Facility: CLINIC | Age: 64
End: 2024-10-09
Payer: COMMERCIAL

## 2024-10-09 VITALS
BODY MASS INDEX: 28.73 KG/M2 | DIASTOLIC BLOOD PRESSURE: 72 MMHG | SYSTOLIC BLOOD PRESSURE: 124 MMHG | WEIGHT: 142.5 LBS | HEIGHT: 59 IN

## 2024-10-09 DIAGNOSIS — R35.0 URINARY FREQUENCY: ICD-10-CM

## 2024-10-09 DIAGNOSIS — R10.2 PELVIC PAIN: Primary | ICD-10-CM

## 2024-10-09 LAB
BILIRUB UR QL STRIP.AUTO: NEGATIVE
CLARITY UR REFRACT.AUTO: CLEAR
COLOR UR AUTO: YELLOW
GLUCOSE UR STRIP.AUTO-MCNC: NORMAL MG/DL
KETONES UR STRIP.AUTO-MCNC: NEGATIVE MG/DL
LEUKOCYTE ESTERASE UR QL STRIP.AUTO: NEGATIVE
NITRITE UR QL STRIP.AUTO: NEGATIVE
PH UR STRIP.AUTO: 5 [PH] (ref 5–8)
PROT UR STRIP.AUTO-MCNC: NEGATIVE MG/DL
RBC UR QL AUTO: NEGATIVE
SP GR UR STRIP.AUTO: 1.01 (ref 1–1.03)
UROBILINOGEN UR STRIP.AUTO-MCNC: NORMAL MG/DL

## 2024-10-09 PROCEDURE — 99212 OFFICE O/P EST SF 10 MIN: CPT | Performed by: NURSE PRACTITIONER

## 2024-10-09 PROCEDURE — 81003 URINALYSIS AUTO W/O SCOPE: CPT | Performed by: NURSE PRACTITIONER

## 2024-10-09 PROCEDURE — 81514 NFCT DS BV&VAGINITIS DNA ALG: CPT | Performed by: NURSE PRACTITIONER

## 2024-10-09 PROCEDURE — 87086 URINE CULTURE/COLONY COUNT: CPT | Performed by: NURSE PRACTITIONER

## 2024-10-09 RX ORDER — FLUOCINONIDE TOPICAL SOLUTION USP, 0.05% 0.5 MG/ML
SOLUTION TOPICAL
COMMUNITY
Start: 2024-08-19

## 2024-10-09 RX ORDER — CLOBETASOL PROPIONATE 0.5 MG/G
CREAM TOPICAL
COMMUNITY
Start: 2024-06-19

## 2024-10-09 RX ORDER — FLUOCINOLONE ACETONIDE 0.11 MG/ML
OIL TOPICAL
COMMUNITY
Start: 2024-06-19

## 2024-10-09 NOTE — PROGRESS NOTES
Subjective:  63 year old    Chief Complaint   Patient presents with    Pelvic Pain     Patient is having discomfort in abdominal area, has partial hysterectomy, for about 4 weeks now. No discharge or odor, does have urinary frequency     Pt here today with complaints of pelvic pain for 1 months  Has improved over time  Has history of TLH for fibroids ()  Denies usual vaginal discharge and odor  She does note urinary frequency. Denies hematuria, fever, chills and body aches    Review of Systems:  Pertinent items are noted in the HPI.    Objective:  /72   Ht 59\"   Wt 142 lb 8 oz (64.6 kg)       Physical Examination:  General appearance: Well dressed, well nourished in no apparent distress  Neurologic/Psychiatric: Alert and oriented to person, place and time, mood normal, affect appropriate  Abdomen: Soft, non-tender, non-distended, no masses, no hepatosplenomegaly, no hernias, no inguinal lymphadenopathy  Pelvic:    External genitalia- Normal, Bartholin's, urethra, skeins glands normal   Vagina- No vaginal lesions, physiologic discharge   Cervix- surgically absent   Uterus- surgically absent       Assessment/Plan:      Diagnoses and all orders for this visit:    Pelvic pain  -     Vaginitis Vaginosis PCR Panel; Future  - will treat based on culture results  - if persistent may need GI follow up or can consider pelvic floor PT    Urinary frequency  -     Urinalysis, Routine; Future  -     Urine Culture, Routine; Future  - push fluids  - will treat based on culture results  - to follow up with new/worsening symptoms or no improvement        Return if symptoms worsen or fail to improve.

## 2024-10-10 LAB
BV BACTERIA DNA VAG QL NAA+PROBE: NEGATIVE
C GLABRATA DNA VAG QL NAA+PROBE: NEGATIVE
C KRUSEI DNA VAG QL NAA+PROBE: NEGATIVE
CANDIDA DNA VAG QL NAA+PROBE: NEGATIVE
T VAGINALIS DNA VAG QL NAA+PROBE: NEGATIVE

## 2024-10-23 DIAGNOSIS — E03.9 HYPOTHYROIDISM, UNSPECIFIED TYPE: ICD-10-CM

## 2024-10-23 RX ORDER — LEVOTHYROXINE SODIUM 50 UG/1
50 TABLET ORAL DAILY
Qty: 90 TABLET | Refills: 0 | Status: SHIPPED | OUTPATIENT
Start: 2024-10-23

## 2024-10-23 NOTE — TELEPHONE ENCOUNTER
Medication requested:   LEVOTHYROXINE 50 MCG Oral Tab [[id number]] (Order [id number])     Dose: see above    Is patient requesting 30 or 90 day supply:  90    Pharmacy name/location:    [id number] - [place], [place] - [facility] [address] [facility] [address], [phone], [phone]   [address] [facility] [address]   Phone: [phone] Fax: [phone]   Hours: Not open 24 hours       LOV:  4/12/24    Is the patient due for appointment: no (if so, please schedule)    Additional notes:  NA

## 2024-10-23 NOTE — TELEPHONE ENCOUNTER
Last time medication was refilled 07/18/2023  Last office visit  04/12/2024  Next office visit due/scheduled No future appointments.    Passed protocol, Medication sent.

## 2024-12-12 DIAGNOSIS — K52.9 COLITIS: ICD-10-CM

## 2024-12-12 RX ORDER — MESALAMINE 1.2 G/1
2.4 TABLET, DELAYED RELEASE ORAL DAILY
Qty: 60 TABLET | Refills: 0 | Status: SHIPPED | OUTPATIENT
Start: 2025-01-01

## 2024-12-12 NOTE — TELEPHONE ENCOUNTER
Medication requested:   MESALAMINE 1.2 g Oral Tab EC         Is patient requesting 30 or 90 day supply:  60    Pharmacy name/location:  [id number] [place] [address] AT [facility] [address], [phone], [phone]     LOV: 4/12/24    Additional notes:  Patient requested no to be refilled until 1/1/25 as she will have new insurance

## 2024-12-12 NOTE — TELEPHONE ENCOUNTER
Last time medication was refilled 09/23/2024  Last office visit  04/12/2024  Next office visit due/scheduled No future appointments.      Medication not on protocol.

## 2024-12-12 NOTE — TELEPHONE ENCOUNTER
Chief complaint:   Chief Complaint   Patient presents with   • Dizziness   • Cough   • Congestion   • Pharyngitis   • Headache       Vitals:  Visit Vitals  /68 (BP Location: RUE - Right upper extremity, Patient Position: Standing)   Pulse 85   Temp 98.4 °F (36.9 °C) (Oral)   Resp 18   LMP 04/12/2017 (Approximate)   SpO2 95%       HISTORY OF PRESENT ILLNESS     This is a 60 year old female who presents today with complaints of congestion, productive cough, fatigue, ear fullness, and headache x 1 week. Pt says she had a sore throat a couple weeks ago and was prescribed Augmentin by someone at her work. Her throat is no longer sore, but other symptoms have gotten worse. She denies fever, chills, nausea, vomiting, sore throat, diarrhea, CP, or SOB. She has intermittently been taking Mucinex and other cold medicine. Pt would like a CXR today to r/o PNA.      Other significant problems:  Patient Active Problem List    Diagnosis Date Noted   • Coronary artery calcification 12/02/2021     Priority: Low     EKG/Treadmill Stress Test 10/11/2019:  Nonischemic response electrocardiographically   Nonischemic response subjectively   Normal blood pressure response to exercise   Normal heart rate recovery   Good/Excellent exercise capacity, achieving 11.7 METS     CT Calcium Score 2/5/2020:  FINDINGS:  Total Agatston Coronary Calcium Score: 13  Percentile: 80th  LMA: 18  LAD: 0  LCX: 0  RCA: 0  Ascending Aorta: 3.5 cm  Descending Aorta: 2.2 cm  Main Pulmonary Artery: 2.3 cm  Extra-coronary vascular calcification: There is very mild calcification in  the thoracic aorta  Pericardium: Normal.  IMPRESSION:  1.  Total coronary artery calcium score is 13. 80% of people matched for  age, gender, and race/ethnicity who are free of clinical cardiovascular  disease and treated diabetes had less calcium than was detected in this  study.  2.  There is very mild calcification in the thoracic aorta      • Familial hyperlipidemia 12/02/2021  Forwarding refill script to refill basket. Per patient request, refill dated on 1/1/25.       Priority: Low     5/24/2017 Lipid Panel: Chol 202, , HDL 50, .   4/4/2018 Lipid Panel: Chol 219, , HDL 46, .  12/19/2019 Lipid Panel: Chol 223, , HDL 49, .  2/25/2020 Lipid Panel: Chol 167, , HDL 51, TG 65.  5/27/2020 Lipid Panel: Chol 216, , HDL 48, .  12/16/2020 Lipid Panel: Chol 295, , HDL 55, .  10/4/2021 Lipid Panel: Chol 191, , HDL 50, TG 87.       • Acute midline thoracic back pain 07/20/2021     Priority: Low     Back pain located between scapula, onset 2 days ago, woke up with the pain. She has not had this pain before. She has been doing neck exercises for her neck pain. She also does work lifting patients (transferring) on the weekend. She has taken ibuprofen 800mg twice daily for pain with some relief. She had OMT done to her back yesterday without any improvement today, but did have some relief during the treatment. She denies chest pain, difficulty breathing, numbness, tingling or radiation of pain. She has had imaging of C spine showing degenerative changes at C5-6, has not had thoracic imaging before.     • Hematuria 11/18/2019     Priority: Low     Needs repeat testing Oct 2020     • Acute midline low back pain without sciatica 08/15/2019     Priority: Low   • Borderline ascending aorta dilatation 04/22/2016     Priority: Low     Echo 2/24/2016:  Normal biventricular size and systolic function, no LV regional wall motion abnormalities; LVEF= 58%.  Borderline LVH with normal diastolic function.  Mild biatrial enlargement, LA volume index= 40 ml/m².  No significant valve abnormalities.  Borderline dilated ascending aorta (3.6 cm).    CT Calcium Score 2/5/2020:  Ascending Aorta: 3.5 cm     • Palpitations      Priority: Low   • Left foot pain 10/23/2015     Priority: Low   • History of basal cell carcinoma on right anterior arm excised 09/2015 09/14/2015     Priority: Low   • HAV (hallux abducto valgus) 09/14/2015      Priority: Low   • Menorrhagia 03/27/2015     Priority: Low   • Endometrial thickening on ultra sound 03/27/2015     Priority: Low   • Frontal sinusitis 02/28/2015     Priority: Low   • HA (headache) 02/28/2015     Priority: Low   • Bronchitis 02/28/2015     Priority: Low   • Anxiety      Priority: Low   • History of hyperthyroidism      Priority: Low     Treated with radioactive iodine     • Hypothyroidism (acquired)      Priority: Low   • Rectal bleeding      Priority: Low     Infrequent     • Sinus headache      Priority: Low   • Esophageal reflux      Priority: Low       PAST MEDICAL, FAMILY AND SOCIAL HISTORY     Medications:  Current Outpatient Medications   Medication Sig Dispense Refill   • amoxicillin-clavulanate (AUGMENTIN) 875-125 MG per tablet Take 1 tablet by mouth every 12 hours. 20 tablet 0   • ALPRAZolam (XANAX) 1 MG tablet Take 1 tablet by mouth 3 times daily as needed. 90 tablet 1   • levothyroxine 125 MCG tablet Take 1 tablet by mouth daily. 90 tablet 0   • Na Sulfate-K Sulfate-Mg Sulf (Suprep Bowel Prep Kit) 17.5-3.13-1.6 GM/177ML Solution Please take as directed in your prep letter. 1 kit 0   • ibuprofen (MOTRIN) 800 MG tablet Take 1 tablet by mouth in the morning and 1 tablet in the evening with food. Please make an appointment for refills. 60 tablet 0   • sertraline (ZOLOFT) 50 MG tablet Take 1 tablet by mouth daily. 90 tablet 0   • nicotine (NICODERM) 7 MG/24HR patch Place 1 patch onto the skin every 24 hours. 28 patch 3   • ALPRAZolam (XANAX) 1 MG tablet Take 1 tablet by mouth 3 times daily as needed. 90 tablet 1   • fluticasone (FLONASE) 50 MCG/ACT nasal spray Spray 1 spray in each nostril 2 times daily. 16 g 1   • valACYclovir (VALTREX) 500 MG tablet Take 1 tablet by mouth 2 times every day as needed. 60 tablet 6   • loratadine (CLARITIN) 10 MG tablet Take 1 tablet by mouth daily. 90 tablet 1   • aspirin 81 MG EC tablet Take 1 tablet by mouth daily. 90 tablet 3   • atorvastatin (LIPITOR) 40  MG tablet Take 1 tablet by mouth daily. 90 tablet 1   • omeprazole (PrilOSEC) 20 MG capsule Take 1 capsule by mouth 2 times daily (30 minutes to 1 hour before meals). 60 capsule 6   • estradiol (ESTRACE) 0.1 MG/GM vaginal cream Place 1 g vaginally 2 days a week. 42.5 g 12   • COVID-19 mRNA, Pfizer, 30 MCG/0.3ML vaccine Inject 0.3 mLs into the muscle. 0.3 mL 0   • eletriptan (RELPAX) 40 MG tablet Take one-HALF or 1 tablet by mouth 2 times daily as needed for Migraine. 10 tablet 3   • cyclobenzaprine (FLEXERIL) 10 MG tablet Take 1 tablet by mouth at bedtime. 30 tablet 2   • albuterol 108 (90 Base) MCG/ACT inhaler Inhale 2 puffs into the lungs every 4 hours as needed for Shortness of Breath or Wheezing (wheezing). 8.5 g 0   • clobetasol (TEMOVATE) 0.05 % topical solution Apply 2 times daily to the scalp for 5 days, then 2-3 times weekly to maintain effect. Place 5-6 drops on scalp (best if scalp wet), then rub in. 60 mL 2   • ketoconazole (NIZORAL) 2 % cream Apply 1 application topically 2 times daily. 30 g 0   • triamcinolone (ARISTOCORT) 0.1 % ointment Apply 1 application topically 2 times daily. Until itching and redness is gone 30 g 0   • diclofenac (VOLTAREN) 1 % gel Apply to the affected area as directed 1 to 2 times a day as needed 100 g 0   • lidocaine (LIDODERM) 5 % Place 1 patch onto the skin every 24 hours. Remove patch 12 hours after applying 30 patch 1   • Biotin 10 MG Tab      • Omega-3 Fatty Acids (FISH OIL) 1000 MG capsule Take 2 g by mouth daily. 90 capsule 3     No current facility-administered medications for this visit.       Allergies:  ALLERGIES:  No Known Allergies    Past Medical  History/Surgeries:  Past Medical History:   Diagnosis Date   • Anxiety    • Arthritis    • Bronchitis    • Chronic pain     right tennis elbow   • Depression    • Esophageal reflux    • Heavy menstrual bleeding    • History of basal cell carcinoma on right anterior arm excised 09/2015    • History of hyperthyroidism      Treated with radioactive iodine   • Hypothyroidism (acquired)    • Murmur     Since childhood   • Palpitations    • Rectal bleeding     Infrequent   • Sinus headache    • Sinusitis, chronic        Past Surgical History:   Procedure Laterality Date   • Colonoscopy diagnostic  10/06/2012    10 Year Recall/ [facility]/ Dr. Ritter   • Hysteroscopy, diagnostic  2015   • Mandible fracture surgery  1983   • Tubal ligation  1998   • Xray mammogram screening bilat  2011       Family History:  Family History   Problem Relation Age of Onset   • Cancer Father         throat CA   • Cancer, Breast Sister 65   • Cancer Sister         Lung (vs breast?), metastatic   • Heart Brother    • Heart disease Brother    • Stroke Brother 72   • Thyroid Brother    • Cancer, Prostate Brother 60   • Stroke Brother    • Heart disease Maternal Grandmother    • Diabetes Maternal Grandmother    • Cancer Maternal Aunt 60        ovarian   • Cancer Maternal Aunt         lung   • Atrial Fibrilliation Maternal Aunt        Social History:  Social History     Tobacco Use   • Smoking status: Former     Packs/day: 0.50     Years: 46.00     Pack years: 23.00     Types: Cigarettes     Start date: 11/3/1976     Quit date: 2022     Years since quittin.2   • Smokeless tobacco: Never   Substance Use Topics   • Alcohol use: Not Currently     Comment: Occasionally       REVIEW OF SYSTEMS     Review of Systems   Constitutional: Positive for fatigue. Negative for chills and fever.   HENT: Positive for congestion, ear pain (fullness) and sinus pain. Negative for sore throat.    Eyes: Negative.    Respiratory: Positive for cough. Negative for shortness of breath.    Cardiovascular: Negative for chest pain.   Gastrointestinal: Negative for diarrhea, nausea and vomiting.   Genitourinary: Negative.    Musculoskeletal: Negative.    Skin: Negative.    Neurological: Positive for dizziness and headaches.   Psychiatric/Behavioral: Negative.         PHYSICAL EXAM     Physical Exam  Vitals and nursing note reviewed.   Constitutional:       Appearance: Normal appearance.   HENT:      Head: Normocephalic and atraumatic.      Right Ear: A middle ear effusion is present. Tympanic membrane is bulging.      Left Ear: A middle ear effusion is present. Tympanic membrane is bulging.      Nose: Congestion present.      Mouth/Throat:      Mouth: Mucous membranes are moist.      Pharynx: Oropharynx is clear.   Eyes:      Pupils: Pupils are equal, round, and reactive to light.   Cardiovascular:      Rate and Rhythm: Normal rate and regular rhythm.      Pulses: Normal pulses.      Heart sounds: Normal heart sounds.   Pulmonary:      Effort: Pulmonary effort is normal.      Breath sounds: Normal breath sounds and air entry. No decreased breath sounds, wheezing, rhonchi or rales.   Lymphadenopathy:      Cervical: Cervical adenopathy present.   Skin:     General: Skin is warm and dry.   Neurological:      General: No focal deficit present.      Mental Status: She is alert and oriented to person, place, and time. Mental status is at baseline.   Psychiatric:         Mood and Affect: Mood normal.         Thought Content: Thought content normal.         Judgment: Judgment normal.         ASSESSMENT/PLAN     1. Suspected COVID-19 virus infection    - SARS-COV-2/INFLUENZA BY PCR; resulted negative  - XR CHEST PA AND LATERAL; Independently read and agree with radiologist's findings - IMPRESSION:  Cardiomediastinal silhouette is normal. No focal areas of consolidation. No pleural effusion.    2. Bilateral otitis media with effusion    - Start amox as below.  If symptoms persist after abx complete, see PCP for further evaluation.  - Educational materials provided and discussed.  - amoxicillin (AMOXIL) 875 MG tablet; Take 1 tablet by mouth in the morning and 1 tablet in the evening. Do all this for 7 days.  Dispense: 14 tablet; Refill: 0    3. Viral URI with cough    - Supportive  management discussed.   - Start Mucinex and Sudafed, restart Flonase.  - Educational materials provided and discussed.    Preventative measures, supportive cares, and return precautions for the above diagnoses were discussed with the patient as appropriate. Patient was referred to the AVS for further instructions. Anticipatory guidance provided in AVS.     If symptoms persist, worsen, new symptoms emerge, patient does not improve, or patient has any other concerns they were advised to please follow up in urgent care, emergency room, or with PCP. Recommended to monitor for red flag symptoms that would warrant immediate re-evaluation in the UC or ED.  Discussed etiologies and differential with the associated diagnosis/symptoms and common complications. Discussed treatment plan with patient, who understands and agrees with plan. The risks, benefits, possible side effects, and drug interactions of medications ordered were reviewed with the patient. Medication instructions were discussed with patient and the consequences of not taking the medications.    Chart and Ancillary Data Collection/Review:  Prior and pertinent available EMR records, nursing notes, flow sheets, medical records including previous history and physical, recent lab work, recent imaging, and recent consultations were reviewed by myself.    EAGLE Castillo

## 2025-01-02 DIAGNOSIS — E03.9 HYPOTHYROIDISM, UNSPECIFIED TYPE: ICD-10-CM

## 2025-01-02 RX ORDER — LEVOTHYROXINE SODIUM 50 UG/1
50 TABLET ORAL DAILY
Qty: 90 TABLET | Refills: 0 | Status: SHIPPED | OUTPATIENT
Start: 2025-01-02

## 2025-01-02 NOTE — TELEPHONE ENCOUNTER
Last time medication was refilled 10/23/2024  Last office visit  04/12/2024  Next office visit due/scheduled No future appointments.      Passed protocol, Medication sent.

## 2025-01-09 NOTE — PROGRESS NOTES
Nurys Oneill is a 64 year old female.  HPI:   HPI   Pt presents today for c/o neck pain, bilateral knee pain, intermittent headaches after she sustained a  fall a week ago. Pt fell at [employer] in the aisle, slipped in the aisle and fell on the back, hit her head and the left leg bent back. She was evaluated at . Had imaging completed in both knees (unable to view results) per patient there was no acute findings.    Was experiencing headaches, which have decreased in frequency/severity. Has been taking Tylenol. Had some nausea at the beginning but no vomiting.   Main complaint is bilateral knee pain and neck pain with movements.     Current Outpatient Medications   Medication Sig Dispense Refill    cyclobenzaprine 10 MG Oral Tab Take 1 tablet (10 mg total) by mouth nightly as needed. 20 tablet 0    naproxen 500 MG Oral Tab Take 1 tablet (500 mg total) by mouth 2 (two) times daily with meals. 28 tablet 0    LEVOTHYROXINE 50 MCG Oral Tab TAKE 1 TABLET(50 MCG) BY MOUTH DAILY 90 tablet 0    mesalamine 1.2 g Oral Tab EC Take 2 tablets (2.4 g total) by mouth daily. 60 tablet 0    Fluocinolone Acetonide Scalp 0.01 % External Oil Apply to scalp 2x daily for 2 weeks, then reduce to as needed      clobetasol 0.05 % External Cream Apply 2x a day for 2 weeks, then take a 2 week break. Repeat as necessary.      Fluocinonide 0.05 % External Solution       Ascorbic Acid (VITAMIN C) 100 MG Oral Chew Tab As Directed.      Zinc 10 MG Mouth/Throat Lozenge       montelukast 10 MG Oral Tab Take 1 tablet (10 mg total) by mouth daily. 90 tablet 0    Meloxicam 15 MG Oral Tab Take 1 tablet (15 mg total) by mouth daily as needed for Pain. 30 tablet 0    fluticasone propionate 50 MCG/ACT Nasal Suspension 2 sprays by Each Nare route daily. 3 each 0    olopatadine 0.1 % Ophthalmic Solution Apply 1 drop to eye 2 (two) times daily. INSTILL 1 DROP IN RIGHT EYE TWICE DAILY 5 mL 0    Multiple Vitamins-Minerals (MULTIVITAMIN ADULT OR) every other  day.          Past Medical History:    Allergic rhinitis    Visual impairment    glasses      Social History:  Social History     Socioeconomic History    Marital status:    Tobacco Use    Smoking status: Never    Smokeless tobacco: Never    Tobacco comments:     non-smoker   Vaping Use    Vaping status: Never Used   Substance and Sexual Activity    Alcohol use: Yes     Comment: occasionally    Drug use: No    Sexual activity: Yes     Partners: Male        REVIEW OF SYSTEMS:   Review of Systems   Constitutional: Negative.    HENT: Negative.     Respiratory: Negative.     Cardiovascular: Negative.    Musculoskeletal:  Positive for arthralgias and neck pain. Negative for joint swelling and neck stiffness.   Skin: Negative.    Neurological:  Positive for headaches. Negative for tremors and weakness.   Psychiatric/Behavioral: Negative.           EXAM:   /80   Pulse 80   Temp 98.3 °F (36.8 °C)   Resp 14   Ht 4' 11\" (1.499 m)   Wt 145 lb (65.8 kg)   SpO2 98%   BMI 29.29 kg/m²   Physical Exam  Vitals and nursing note reviewed.   Constitutional:       Appearance: Normal appearance. She is normal weight.   Cardiovascular:      Rate and Rhythm: Normal rate and regular rhythm.      Pulses: Normal pulses.      Heart sounds: Normal heart sounds.   Pulmonary:      Effort: Pulmonary effort is normal.      Breath sounds: Normal breath sounds.   Musculoskeletal:         General: Tenderness present.      Cervical back: Tenderness present. No bony tenderness. No pain with movement.      Lumbar back: Normal.        Back:       Right knee: Normal.      Instability Tests: Anterior drawer test negative. Posterior drawer test negative. Anterior Lachman test negative. Medial Darien test negative and lateral Darien test negative.      Left knee: Normal.      Instability Tests: Anterior drawer test negative. Posterior drawer test negative. Anterior Lachman test negative. Medial Darien test negative and lateral  Darien test negative.      Comments: Tender areas with palpation   Skin:     General: Skin is warm and dry.      Capillary Refill: Capillary refill takes less than 2 seconds.   Neurological:      General: No focal deficit present.      Mental Status: She is alert and oriented to person, place, and time. Mental status is at baseline.      GCS: GCS eye subscore is 4. GCS verbal subscore is 5. GCS motor subscore is 6.      Cranial Nerves: Cranial nerves 2-12 are intact.      Sensory: Sensation is intact.      Motor: Motor function is intact.      Coordination: Coordination is intact.      Gait: Gait is intact.   Psychiatric:         Mood and Affect: Mood normal.         Behavior: Behavior normal.         Thought Content: Thought content normal.         Judgment: Judgment normal.          ASSESSMENT AND PLAN:   Diagnoses and all orders for this visit:    Fall, initial encounter    Concussion without loss of consciousness, initial encounter   -discussed due to headaches and nausea that patient experienced she might have a concusion. Headaches are improving. Advised brain rest for the next few weeks. To notify office if headaches worsen or persist.  Acute neck pain  -     OP REFERRAL TO [facility]  -     naproxen 500 MG Oral Tab; Take 1 tablet (500 mg total) by mouth 2 (two) times daily with meals.    Strain of neck muscle, initial encounter  -     cyclobenzaprine 10 MG Oral Tab; Take 1 tablet (10 mg total) by mouth nightly as needed.  -     naproxen 500 MG Oral Tab; Take 1 tablet (500 mg total) by mouth 2 (two) times daily with meals.    Acute pain of both knees  -     OP REFERRAL TO [facility]  -     naproxen 500 MG Oral Tab; Take 1 tablet (500 mg total) by mouth 2 (two) times daily with meals.       Requested Prescriptions     Signed Prescriptions Disp Refills    cyclobenzaprine 10 MG Oral Tab 20 tablet 0     Sig: Take 1 tablet (10 mg total) by mouth nightly as needed.     naproxen 500 MG Oral Tab 28 tablet 0     Sig: Take 1 tablet (500 mg total) by mouth 2 (two) times daily with meals.         The patient indicates understanding of these issues and agrees to the plan.  The patient is asked to return if symptoms persist or worsen.

## 2025-01-10 ENCOUNTER — OFFICE VISIT (OUTPATIENT)
Dept: INTERNAL MEDICINE CLINIC | Facility: CLINIC | Age: 65
End: 2025-01-10
Payer: COMMERCIAL

## 2025-01-10 VITALS
TEMPERATURE: 98 F | HEART RATE: 80 BPM | WEIGHT: 145 LBS | OXYGEN SATURATION: 98 % | SYSTOLIC BLOOD PRESSURE: 122 MMHG | HEIGHT: 59 IN | DIASTOLIC BLOOD PRESSURE: 80 MMHG | RESPIRATION RATE: 14 BRPM | BODY MASS INDEX: 29.23 KG/M2

## 2025-01-10 DIAGNOSIS — S16.1XXA STRAIN OF NECK MUSCLE, INITIAL ENCOUNTER: ICD-10-CM

## 2025-01-10 DIAGNOSIS — S06.0X0A CONCUSSION WITHOUT LOSS OF CONSCIOUSNESS, INITIAL ENCOUNTER: ICD-10-CM

## 2025-01-10 DIAGNOSIS — M25.561 ACUTE PAIN OF BOTH KNEES: ICD-10-CM
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
## 2025-01-10 DIAGNOSIS — M25.562 ACUTE PAIN OF BOTH KNEES: ICD-10-CM

## 2025-01-10 DIAGNOSIS — Z28.21 PNEUMOCOCCAL VACCINE REFUSED: ICD-10-CM

## 2025-01-10 DIAGNOSIS — M54.2 ACUTE NECK PAIN: ICD-10-CM

## 2025-01-10 RX ORDER — NAPROXEN 500 MG/1
500 TABLET ORAL 2 TIMES DAILY WITH MEALS
Qty: 28 TABLET | Refills: 0 | Status: SHIPPED | OUTPATIENT
Start: 2025-01-10

## 2025-01-10 RX ORDER — CYCLOBENZAPRINE HCL 10 MG
10 TABLET ORAL NIGHTLY PRN
Qty: 20 TABLET | Refills: 0 | Status: SHIPPED | OUTPATIENT
Start: 2025-01-10

## 2025-01-15 ENCOUNTER — TELEPHONE (OUTPATIENT)
Dept: INTERNAL MEDICINE CLINIC | Facility: CLINIC | Age: 65
End: 2025-01-15

## 2025-01-15 DIAGNOSIS — M25.561 ACUTE PAIN OF BOTH KNEES: ICD-10-CM

## 2025-01-15 DIAGNOSIS — M25.562 ACUTE PAIN OF BOTH KNEES: ICD-10-CM

## 2025-01-15 DIAGNOSIS — M54.2 ACUTE NECK PAIN: Primary | ICD-10-CM

## 2025-02-17 DIAGNOSIS — K52.9 COLITIS: ICD-10-CM

## 2025-02-18 RX ORDER — MESALAMINE 1.2 G/1
2.4 TABLET, DELAYED RELEASE ORAL DAILY
Qty: 60 TABLET | Refills: 2 | Status: SHIPPED | OUTPATIENT
Start: 2025-02-18

## 2025-02-18 NOTE — TELEPHONE ENCOUNTER
Last time medication was refilled 1/1/25  Last office visit  1/10/25  Next office visit due/scheduled none scheduled

## 2025-02-18 NOTE — TELEPHONE ENCOUNTER
Medication requested: mesalamine 1.2 g Oral Tab EC       Is patient requesting 30 or 90 day supply:  90    Pharmacy name/location:  [id number] [place] [id number] AT [facility] [address], [phone], [phone]     LOV:  01/10/2025    Is the patient due for appointment: yes- patient will call back to schedule  (if so, please schedule)    Additional notes:  second request

## 2025-02-18 NOTE — PROGRESS NOTES
Nurys Oneill is a 64 year old female.  HPI:   Dizziness  This is a new problem. The current episode started 1 to 4 weeks ago (x 2 wks). The problem occurs intermittently. The problem has been unchanged. Associated symptoms include congestion, headaches and vertigo. Pertinent negatives include no abdominal pain, anorexia, arthralgias, change in bowel habit, chest pain, chills, coughing, diaphoresis, fatigue, fever, joint swelling, myalgias, nausea, neck pain, numbness, rash, sore throat, swollen glands, urinary symptoms, visual change, vomiting or weakness.   Pt states she is also experiencing right ear tinnitus, bilateral eye itchiness, nasal congestion, headaches. Symptoms started at the same time as dizziness.  Denies vision changes, unilateral extremity weakness, CP, SOB.  Pt states after she stopped taking Levothyroxine her symptoms improved.     Current Outpatient Medications   Medication Sig Dispense Refill    predniSONE 20 MG Oral Tab Take 2 tablets (40 mg total) by mouth daily for 5 days. 10 tablet 0    mesalamine 1.2 g Oral Tab EC Take 2 tablets (2.4 g total) by mouth daily. 60 tablet 2    cyclobenzaprine 10 MG Oral Tab Take 1 tablet (10 mg total) by mouth nightly as needed. 20 tablet 0    naproxen 500 MG Oral Tab Take 1 tablet (500 mg total) by mouth 2 (two) times daily with meals. 28 tablet 0    LEVOTHYROXINE 50 MCG Oral Tab TAKE 1 TABLET(50 MCG) BY MOUTH DAILY 90 tablet 0    Fluocinolone Acetonide Scalp 0.01 % External Oil Apply to scalp 2x daily for 2 weeks, then reduce to as needed      clobetasol 0.05 % External Cream Apply 2x a day for 2 weeks, then take a 2 week break. Repeat as necessary.      Ascorbic Acid (VITAMIN C) 100 MG Oral Chew Tab As Directed.      montelukast 10 MG Oral Tab Take 1 tablet (10 mg total) by mouth daily. 90 tablet 0    Meloxicam 15 MG Oral Tab Take 1 tablet (15 mg total) by mouth daily as needed for Pain. 30 tablet 0    fluticasone propionate 50 MCG/ACT Nasal Suspension  2 sprays by Each Nare route daily. 3 each 0    olopatadine 0.1 % Ophthalmic Solution Apply 1 drop to eye 2 (two) times daily. INSTILL 1 DROP IN RIGHT EYE TWICE DAILY 5 mL 0    Multiple Vitamins-Minerals (MULTIVITAMIN ADULT OR) every other day.        Fluocinonide 0.05 % External Solution       Zinc 10 MG Mouth/Throat Lozenge  (Patient not taking: Reported on 2/19/2025)        Past Medical History:    Allergic rhinitis    Visual impairment    glasses      Social History:  Social History     Socioeconomic History    Marital status:    Tobacco Use    Smoking status: Never    Smokeless tobacco: Never    Tobacco comments:     non-smoker   Vaping Use    Vaping status: Never Used   Substance and Sexual Activity    Alcohol use: Yes     Comment: occasionally    Drug use: No    Sexual activity: Yes     Partners: Male        REVIEW OF SYSTEMS:   Review of Systems   Constitutional:  Negative for chills, diaphoresis, fatigue and fever.   HENT:  Positive for congestion and rhinorrhea. Negative for ear pain, postnasal drip, sinus pressure, sinus pain and sore throat.    Eyes:  Positive for itching. Negative for pain, discharge, redness and visual disturbance.   Respiratory: Negative.  Negative for cough.    Cardiovascular: Negative.  Negative for chest pain.   Gastrointestinal: Negative.  Negative for abdominal pain, anorexia, change in bowel habit, nausea and vomiting.   Genitourinary: Negative.    Musculoskeletal: Negative.  Negative for arthralgias, joint swelling, myalgias and neck pain.   Skin:  Negative for rash.   Neurological:  Positive for dizziness, vertigo and headaches. Negative for tremors, syncope, facial asymmetry, weakness, light-headedness and numbness.   Psychiatric/Behavioral: Negative.           EXAM:   /78   Pulse 69   Temp 97.8 °F (36.6 °C)   Resp 16   Ht 4' 11\" (1.499 m)   Wt 146 lb (66.2 kg)   SpO2 98%   BMI 29.49 kg/m²   Physical Exam  Vitals and nursing note reviewed.   Constitutional:        Appearance: Normal appearance. She is normal weight.   HENT:      Right Ear: A middle ear effusion is present. Tympanic membrane is not injected or erythematous.      Left Ear: A middle ear effusion is present. Tympanic membrane is not injected or erythematous.      Nose: Mucosal edema present.      Right Turbinates: Swollen.      Left Turbinates: Swollen.      Right Sinus: No maxillary sinus tenderness or frontal sinus tenderness.      Left Sinus: No maxillary sinus tenderness or frontal sinus tenderness.      Mouth/Throat:      Lips: Pink.      Mouth: Mucous membranes are moist.      Pharynx: Uvula midline. Posterior oropharyngeal erythema and postnasal drip present.      Tonsils: No tonsillar exudate or tonsillar abscesses.   Cardiovascular:      Rate and Rhythm: Normal rate and regular rhythm.      Pulses: Normal pulses.      Heart sounds: Normal heart sounds.   Pulmonary:      Effort: Pulmonary effort is normal.      Breath sounds: Normal breath sounds.   Musculoskeletal:         General: Normal range of motion.      Cervical back: Normal range of motion.   Skin:     General: Skin is warm and dry.      Capillary Refill: Capillary refill takes less than 2 seconds.   Neurological:      General: No focal deficit present.      Mental Status: She is alert and oriented to person, place, and time. Mental status is at baseline.      Cranial Nerves: Cranial nerves 2-12 are intact.      Sensory: Sensation is intact.      Motor: Motor function is intact.      Coordination: Coordination is intact.      Gait: Gait is intact.   Psychiatric:         Mood and Affect: Mood normal.         Behavior: Behavior normal.         Thought Content: Thought content normal.         Judgment: Judgment normal.          ASSESSMENT AND PLAN:   Diagnoses and all orders for this visit:    Dizziness  Tinnitus of right ear  Episodic tension-type headache, not intractable  Acute effusion of both middle ears  Discussed with patient that I  thing her current symptoms could be related due to middle ear effusion/congestion  -   Advised to complete predniSONE 20 MG Oral Tab; Take 2 tablets (40 mg total) by mouth daily for 5 days.   -return in a week for symptom f/u and annual physical    Laboratory exam ordered as part of routine general medical examination  -     CBC With Differential With Platelet; Future  -     Comp Metabolic Panel (14); Future  -     Lipid Panel; Future  -     Vitamin D; Future  -     Urinalysis, Routine; Future    Hypothyroidism, acquired  -     TSH W Reflex To Free T4; Future    Irritable bowel syndrome with diarrhea  -     Gastro Referral - In Network    Requested Prescriptions     Signed Prescriptions Disp Refills    predniSONE 20 MG Oral Tab 10 tablet 0     Sig: Take 2 tablets (40 mg total) by mouth daily for 5 days.         The patient indicates understanding of these issues and agrees to the plan.  The patient is asked to return in 1 week.

## 2025-02-19 ENCOUNTER — LAB ENCOUNTER (OUTPATIENT)
Dept: LAB | Age: 65
End: 2025-02-19
Payer: COMMERCIAL

## 2025-02-19 ENCOUNTER — OFFICE VISIT (OUTPATIENT)
Dept: INTERNAL MEDICINE CLINIC | Facility: CLINIC | Age: 65
End: 2025-02-19
Payer: COMMERCIAL

## 2025-02-19 VITALS
HEART RATE: 69 BPM | RESPIRATION RATE: 16 BRPM | TEMPERATURE: 98 F | SYSTOLIC BLOOD PRESSURE: 118 MMHG | HEIGHT: 59 IN | OXYGEN SATURATION: 98 % | BODY MASS INDEX: 29.43 KG/M2 | WEIGHT: 146 LBS | DIASTOLIC BLOOD PRESSURE: 78 MMHG

## 2025-02-19 DIAGNOSIS — R71.8 ELEVATED MCV: ICD-10-CM

## 2025-02-19 DIAGNOSIS — K58.0 IRRITABLE BOWEL SYNDROME WITH DIARRHEA: ICD-10-CM

## 2025-02-19 DIAGNOSIS — H93.11 TINNITUS OF RIGHT EAR: ICD-10-CM

## 2025-02-19 DIAGNOSIS — Z00.00 LABORATORY EXAM ORDERED AS PART OF ROUTINE GENERAL MEDICAL EXAMINATION: ICD-10-CM

## 2025-02-19 DIAGNOSIS — R42 DIZZINESS: Primary | ICD-10-CM

## 2025-02-19 DIAGNOSIS — E03.9 HYPOTHYROIDISM, ACQUIRED: ICD-10-CM

## 2025-02-19 DIAGNOSIS — E78.00 ELEVATED LDL CHOLESTEROL LEVEL: Primary | ICD-10-CM

## 2025-02-19 DIAGNOSIS — G44.219 EPISODIC TENSION-TYPE HEADACHE, NOT INTRACTABLE: ICD-10-CM

## 2025-02-19 DIAGNOSIS — H65.193 ACUTE EFFUSION OF BOTH MIDDLE EARS: ICD-10-CM

## 2025-02-19 LAB
ALBUMIN SERPL-MCNC: 4.9 G/DL (ref 3.2–4.8)
ALBUMIN/GLOB SERPL: 1.9 {RATIO} (ref 1–2)
ALP LIVER SERPL-CCNC: 65 U/L
ALT SERPL-CCNC: 25 U/L
ANION GAP SERPL CALC-SCNC: 13 MMOL/L (ref 0–18)
AST SERPL-CCNC: 29 U/L (ref ?–34)
BASOPHILS # BLD AUTO: 0.06 X10(3) UL (ref 0–0.2)
BASOPHILS NFR BLD AUTO: 0.8 %
BILIRUB SERPL-MCNC: 0.7 MG/DL (ref 0.2–1.1)
BILIRUB UR QL STRIP.AUTO: NEGATIVE
BUN BLD-MCNC: 12 MG/DL (ref 9–23)
CALCIUM BLD-MCNC: 10.1 MG/DL (ref 8.7–10.6)
CHLORIDE SERPL-SCNC: 105 MMOL/L (ref 98–112)
CHOLEST SERPL-MCNC: 291 MG/DL (ref ?–200)
CLARITY UR REFRACT.AUTO: CLEAR
CO2 SERPL-SCNC: 24 MMOL/L (ref 21–32)
CREAT BLD-MCNC: 1.08 MG/DL
EGFRCR SERPLBLD CKD-EPI 2021: 58 ML/MIN/1.73M2 (ref 60–?)
EOSINOPHIL # BLD AUTO: 0.2 X10(3) UL (ref 0–0.7)
EOSINOPHIL NFR BLD AUTO: 2.7 %
ERYTHROCYTE [DISTWIDTH] IN BLOOD BY AUTOMATED COUNT: 12.8 %
FASTING PATIENT LIPID ANSWER: YES
FASTING STATUS PATIENT QL REPORTED: YES
GLOBULIN PLAS-MCNC: 2.6 G/DL (ref 2–3.5)
GLUCOSE BLD-MCNC: 86 MG/DL (ref 70–99)
GLUCOSE UR STRIP.AUTO-MCNC: NORMAL MG/DL
HCT VFR BLD AUTO: 43.3 %
HDLC SERPL-MCNC: 71 MG/DL (ref 40–59)
HGB BLD-MCNC: 13.9 G/DL
IMM GRANULOCYTES # BLD AUTO: 0.02 X10(3) UL (ref 0–1)
IMM GRANULOCYTES NFR BLD: 0.3 %
KETONES UR STRIP.AUTO-MCNC: NEGATIVE MG/DL
LDLC SERPL CALC-MCNC: 208 MG/DL (ref ?–100)
LEUKOCYTE ESTERASE UR QL STRIP.AUTO: 250
LYMPHOCYTES # BLD AUTO: 2.16 X10(3) UL (ref 1–4)
LYMPHOCYTES NFR BLD AUTO: 28.7 %
MCH RBC QN AUTO: 33.3 PG (ref 26–34)
MCHC RBC AUTO-ENTMCNC: 32.1 G/DL (ref 31–37)
MCV RBC AUTO: 103.6 FL
MONOCYTES # BLD AUTO: 0.55 X10(3) UL (ref 0.1–1)
MONOCYTES NFR BLD AUTO: 7.3 %
NEUTROPHILS # BLD AUTO: 4.53 X10 (3) UL (ref 1.5–7.7)
NEUTROPHILS # BLD AUTO: 4.53 X10(3) UL (ref 1.5–7.7)
NEUTROPHILS NFR BLD AUTO: 60.2 %
NITRITE UR QL STRIP.AUTO: NEGATIVE
NONHDLC SERPL-MCNC: 220 MG/DL (ref ?–130)
OSMOLALITY SERPL CALC.SUM OF ELEC: 293 MOSM/KG (ref 275–295)
PH UR STRIP.AUTO: 5.5 [PH] (ref 5–8)
PLATELET # BLD AUTO: 272 10(3)UL (ref 150–450)
POTASSIUM SERPL-SCNC: 4.6 MMOL/L (ref 3.5–5.1)
PROT SERPL-MCNC: 7.5 G/DL (ref 5.7–8.2)
PROT UR STRIP.AUTO-MCNC: NEGATIVE MG/DL
RBC # BLD AUTO: 4.18 X10(6)UL
RBC UR QL AUTO: NEGATIVE
SODIUM SERPL-SCNC: 142 MMOL/L (ref 136–145)
SP GR UR STRIP.AUTO: 1.02 (ref 1–1.03)
T4 FREE SERPL-MCNC: 0.9 NG/DL (ref 0.8–1.7)
TRIGL SERPL-MCNC: 76 MG/DL (ref 30–149)
TSI SER-ACNC: 4.95 UIU/ML (ref 0.55–4.78)
UROBILINOGEN UR STRIP.AUTO-MCNC: NORMAL MG/DL
VIT D+METAB SERPL-MCNC: 22.9 NG/ML (ref 30–100)
VLDLC SERPL CALC-MCNC: 16 MG/DL (ref 0–30)
WBC # BLD AUTO: 7.5 X10(3) UL (ref 4–11)

## 2025-02-19 PROCEDURE — 36415 COLL VENOUS BLD VENIPUNCTURE: CPT

## 2025-02-19 PROCEDURE — 81001 URINALYSIS AUTO W/SCOPE: CPT

## 2025-02-19 PROCEDURE — 80053 COMPREHEN METABOLIC PANEL: CPT

## 2025-02-19 PROCEDURE — 84443 ASSAY THYROID STIM HORMONE: CPT

## 2025-02-19 PROCEDURE — 82306 VITAMIN D 25 HYDROXY: CPT

## 2025-02-19 PROCEDURE — 85025 COMPLETE CBC W/AUTO DIFF WBC: CPT

## 2025-02-19 PROCEDURE — 84439 ASSAY OF FREE THYROXINE: CPT

## 2025-02-19 PROCEDURE — 80061 LIPID PANEL: CPT

## 2025-02-19 PROCEDURE — 99214 OFFICE O/P EST MOD 30 MIN: CPT

## 2025-02-19 RX ORDER — PREDNISONE 20 MG/1
40 TABLET ORAL DAILY
Qty: 10 TABLET | Refills: 0 | Status: SHIPPED | OUTPATIENT
Start: 2025-02-19 | End: 2025-02-24

## 2025-02-19 RX ORDER — ROSUVASTATIN CALCIUM 20 MG/1
20 TABLET, COATED ORAL NIGHTLY
Qty: 90 TABLET | Refills: 0 | Status: SHIPPED | OUTPATIENT
Start: 2025-02-19

## 2025-02-19 RX ORDER — LEVOTHYROXINE SODIUM 25 UG/1
25 TABLET ORAL
Qty: 90 TABLET | Refills: 0 | Status: SHIPPED | OUTPATIENT
Start: 2025-02-19

## 2025-02-24 ENCOUNTER — OFFICE VISIT (OUTPATIENT)
Dept: INTERNAL MEDICINE CLINIC | Facility: CLINIC | Age: 65
End: 2025-02-24
Payer: COMMERCIAL

## 2025-02-24 VITALS
OXYGEN SATURATION: 98 % | SYSTOLIC BLOOD PRESSURE: 110 MMHG | TEMPERATURE: 99 F | DIASTOLIC BLOOD PRESSURE: 70 MMHG | BODY MASS INDEX: 29.03 KG/M2 | RESPIRATION RATE: 16 BRPM | HEIGHT: 59 IN | HEART RATE: 60 BPM | WEIGHT: 144 LBS

## 2025-02-24 DIAGNOSIS — E03.9 HYPOTHYROIDISM, ACQUIRED: ICD-10-CM

## 2025-02-24 DIAGNOSIS — Z12.31 ENCOUNTER FOR SCREENING MAMMOGRAM FOR MALIGNANT NEOPLASM OF BREAST: ICD-10-CM

## 2025-02-24 DIAGNOSIS — E78.2 MIXED HYPERLIPIDEMIA: ICD-10-CM

## 2025-02-24 DIAGNOSIS — Z00.00 ANNUAL PHYSICAL EXAM: Primary | ICD-10-CM

## 2025-02-24 DIAGNOSIS — H65.193 ACUTE EFFUSION OF BOTH MIDDLE EARS: ICD-10-CM

## 2025-02-24 DIAGNOSIS — L65.9 ALOPECIA: ICD-10-CM

## 2025-02-24 PROCEDURE — 99396 PREV VISIT EST AGE 40-64: CPT

## 2025-02-24 RX ORDER — FLUTICASONE PROPIONATE 50 MCG
2 SPRAY, SUSPENSION (ML) NASAL DAILY
Qty: 1 EACH | Refills: 0 | Status: SHIPPED | OUTPATIENT
Start: 2025-02-24 | End: 2026-02-19

## 2025-02-24 RX ORDER — LEVOTHYROXINE SODIUM 75 UG/1
75 TABLET ORAL
Qty: 90 TABLET | Refills: 0 | Status: CANCELLED
Start: 2025-02-24 | End: 2025-05-25

## 2025-02-24 RX ORDER — CYCLOBENZAPRINE HCL 10 MG
10 TABLET ORAL NIGHTLY PRN
Qty: 20 TABLET | Refills: 0 | Status: CANCELLED | OUTPATIENT
Start: 2025-02-24

## 2025-02-24 RX ORDER — FLUOCINOLONE ACETONIDE 0.11 MG/ML
1 OIL TOPICAL DAILY
Qty: 1 EACH | Refills: 0 | Status: SHIPPED | OUTPATIENT
Start: 2025-02-24

## 2025-02-24 NOTE — PROGRESS NOTES
Wellness Exam    CC: Patient is presenting for a wellness exam    HPI:   Concerns:   Pt completed annual labs. TSH was 4.95. Pt has not been taking Synthroid consistently daily.  LDL was 208. Pt states she has been eating increased amount of eggs. Started Rosuvastatin.   Pt states the dizziness and headaches have improved after completion of the prednisone.  Pertinent Family History:   Family History   Problem Relation Age of Onset    Cancer Neg     Diabetes Neg     Hypertension Neg     Lipids Neg     Heart Disorder Neg     Stroke Neg         Gyne:   No recommendations at this time         Denies pelvic pain, abnormal discharge or genital lesions.    Last mammogram:   Health Maintenance   Topic Date Due    Mammogram  2025      Regular self breast exam: discussed.    Bone Health: Last DEXA: N/A  Osteoporosis prevention: weight resistant exercises, check vitamin D  Last colonoscopy:    Health Maintenance   Topic Date Due    Colorectal Cancer Screening  2027        Reported Health: good.  Diet is regular, exercise: regular.    Past Medical History:    Allergic rhinitis    Visual impairment    glasses     Past Surgical History:   Procedure Laterality Date      1987    Colonoscopy N/A 05/10/2021    Procedure: COLONOSCOPY with biopsies;  Surgeon: Anand Dobbins DO;  Location:  ENDOSCOPY    Colonoscopy N/A 2022    Procedure: COLONOSCOPY with biopsies;  Surgeon: Hardik Riggs MD;  Location:  ENDOSCOPY    Hysterectomy      DUB, fibroids    Chaitanya localization wire 1 site right (cpt=19281) Right     benign cyst removed, 3 sites    Oophorectomy Bilateral 2015    total hystero.     Social History     Socioeconomic History    Marital status:    Tobacco Use    Smoking status: Never    Smokeless tobacco: Never    Tobacco comments:     non-smoker   Vaping Use    Vaping status: Never Used   Substance and Sexual Activity    Alcohol use: Yes     Comment: occasionally     Drug use: No    Sexual activity: Yes     Partners: Male     Medications Ordered Prior to Encounter[1]    Review of Systems   Review of Systems   Constitutional: Negative for fever, chills and fatigue.   HENT: Negative for hearing loss, congestion, sore throat and neck pain.    Eyes: Negative for pain and visual disturbance.   Respiratory: Negative for cough and shortness of breath.    Cardiovascular: Negative for chest pain and palpitations.   Gastrointestinal: Negative for nausea, vomiting, abdominal pain and diarrhea.   Genitourinary: Negative for urgency, frequency of urination, and abnormal vaginal bleeding.   Musculoskeletal: Negative for arthralgias and gait problem.   Skin: Negative for color change and rash.   Neurological: Negative for tremors, weakness and numbness.   Hematological: Negative for adenopathy. Does not bruise/bleed easily.   Psychiatric/Behavioral: Negative for confusion and agitation. The patient is not nervous/anxious.      /70   Pulse 60   Temp 98.6 °F (37 °C)   Resp 16   Ht 4' 11\" (1.499 m)   Wt 144 lb (65.3 kg)   SpO2 98%   BMI 29.08 kg/m²   Physical Exam  Physical Exam    Constitutional: She is oriented to person, place, and time. She appears well-developed. No distress.    Head: Normocephalic and atraumatic.   Eyes: EOM are normal. Pupils are equal, round, and reactive to light. No scleral icterus. Fundoscopic exam: No hemorrhages, A/V nicking, exudates or papilledema.  ENT: TM's clear, nose normal, no oropharyngeal exudates or tonsillar hypertrophy    Neck: Normal range of motion. No thyromegaly present.   Cardiovascular: Normal rate, regular rhythm and normal heart sounds.  No murmur or friction rub heard.  Pulmonary/Chest: Effort normal and breath sounds normal bilaterally. She has no wheezes or rales.   Breasts: deferred  Abdominal: Soft. Bowel sounds are normal. There is no tenderness. No HSM.  Musculoskeletal: Normal range of motion. She exhibits no edema.    Lymphadenopathy: She has no cervical, supraclavicular, or axillary adenopathy.   : deferred  Neurological: She is alert and oriented to person, place, and time. DTRs are +2 and symmetric. Cranial nerves grossly intact.  Skin: Skin is warm. No rash noted. No erythema, pallor or jaundice.   Psychiatric: She has a normal mood and affect and her behavior is normal.     Assessment and Plan:  Nurys Oneill is a 64 year old female here for a wellness exam.    Diagnoses and all orders for this visit:    Annual physical exam    Encounter for screening mammogram for malignant neoplasm of breast  -     [facility] JOVON 2D+3D SCREENING BILAT (CPT=77067/59581); Future    Mixed hyperlipidemia   -recently started Rosuvastatin. Recheck Lipid, CMP in . Provided CT calcium score test information per patient's request.    Hypothyroidism, acquired   -advised to take Levothyroxine 50mcs consistently daily and recheck in 6 wks.    Acute effusion of both middle ears  -     fluticasone propionate 50 MCG/ACT Nasal Suspension; 2 sprays by Each Nare route daily.    Alopecia   -was prescribed by dermatology and would like a refill.  -     Fluocinolone Acetonide Scalp 0.01 % External Oil; Apply 1 Application topically daily.       Age appropriate cancer screening, labs, safety, immunizations were discussed with the patient and ordered as follows:    Health Maintenance Due   Topic Date Due    Pneumococcal Vaccine: 50+ Years (1 of 1 - PCV) Never done    Zoster Vaccines (1 of 2) Never done    COVID-19 Vaccine (4 - 2024-25 season) 09/01/2024    Annual Depression Screening  01/01/2025    Annual Physical  02/05/2025    Mammogram  04/25/2025      No orders of the defined types were placed in this encounter.    Discussed use of sunscreen, wearing seatbelt, recommend regular cardiovascular and weight bearing exercise as well as a well-rounded diet.    Her 5 year prevention plan includes: annual physical and labs, 30 minutes exercise most days of week  and heart healthy diet  Patient/Caregiver Education:  Patient/Caregiver Education: There are no barriers to learning. Medical education done.  Outcome: Patient verbalizes understanding.       Return in 6m for med check.  Educated by: MARANDA Eastman         [1]   Current Outpatient Medications on File Prior to Visit   Medication Sig Dispense Refill    rosuvastatin 20 MG Oral Tab Take 1 tablet (20 mg total) by mouth nightly. 90 tablet 0    levothyroxine 25 MCG Oral Tab Take 1 tablet (25 mcg total) by mouth before breakfast. 90 tablet 0    mesalamine 1.2 g Oral Tab EC Take 2 tablets (2.4 g total) by mouth daily. 60 tablet 2    cyclobenzaprine 10 MG Oral Tab Take 1 tablet (10 mg total) by mouth nightly as needed. 20 tablet 0    clobetasol 0.05 % External Cream Apply 2x a day for 2 weeks, then take a 2 week break. Repeat as necessary.      Fluocinonide 0.05 % External Solution       Ascorbic Acid (VITAMIN C) 100 MG Oral Chew Tab As Directed.      montelukast 10 MG Oral Tab Take 1 tablet (10 mg total) by mouth daily. 90 tablet 0    Meloxicam 15 MG Oral Tab Take 1 tablet (15 mg total) by mouth daily as needed for Pain. 30 tablet 0    fluticasone propionate 50 MCG/ACT Nasal Suspension 2 sprays by Each Nare route daily. 3 each 0    olopatadine 0.1 % Ophthalmic Solution Apply 1 drop to eye 2 (two) times daily. INSTILL 1 DROP IN RIGHT EYE TWICE DAILY 5 mL 0    Multiple Vitamins-Minerals (MULTIVITAMIN ADULT OR) every other day.        predniSONE 20 MG Oral Tab Take 2 tablets (40 mg total) by mouth daily for 5 days. (Patient not taking: Reported on 2/24/2025) 10 tablet 0    naproxen 500 MG Oral Tab Take 1 tablet (500 mg total) by mouth 2 (two) times daily with meals. (Patient not taking: Reported on 2/24/2025) 28 tablet 0    [DISCONTINUED] LEVOTHYROXINE 50 MCG Oral Tab TAKE 1 TABLET(50 MCG) BY MOUTH DAILY 90 tablet 0    Zinc 10 MG Mouth/Throat Lozenge  (Patient not taking: Reported on 2/24/2025)       No current  facility-administered medications on file prior to visit.

## 2025-04-10 DIAGNOSIS — E03.9 HYPOTHYROIDISM, UNSPECIFIED TYPE: ICD-10-CM

## 2025-04-11 RX ORDER — LEVOTHYROXINE SODIUM 50 UG/1
50 TABLET ORAL DAILY
Qty: 90 TABLET | Refills: 0 | OUTPATIENT
Start: 2025-04-11

## 2025-04-11 NOTE — TELEPHONE ENCOUNTER
LEVOTHYROXINE 50 MCG Oral Tab     The original prescription was discontinued on 2/24/2025 by Elda Sandoval MA for the following reason: Duplicate therapy. Renewing this prescription may not be appropriate.

## 2025-05-07 ENCOUNTER — HOSPITAL ENCOUNTER (OUTPATIENT)
Dept: MAMMOGRAPHY | Age: 65
Discharge: HOME | End: 2025-05-07
Payer: COMMERCIAL

## 2025-05-07 DIAGNOSIS — Z12.31 ENCOUNTER FOR SCREENING MAMMOGRAM FOR MALIGNANT NEOPLASM OF BREAST: ICD-10-CM

## 2025-05-07 PROCEDURE — 77063 BREAST TOMOSYNTHESIS BI: CPT

## 2025-05-07 PROCEDURE — 77067 SCR MAMMO BI INCL CAD: CPT

## 2025-05-21 DIAGNOSIS — K52.9 COLITIS: ICD-10-CM

## 2025-05-21 DIAGNOSIS — H65.193 ACUTE EFFUSION OF BOTH MIDDLE EARS: ICD-10-CM

## 2025-05-21 RX ORDER — MESALAMINE 1.2 G/1
2.4 TABLET, DELAYED RELEASE ORAL DAILY
Qty: 60 TABLET | Refills: 2 | Status: SHIPPED | OUTPATIENT
Start: 2025-05-21

## 2025-05-21 RX ORDER — FLUTICASONE PROPIONATE 50 MCG
2 SPRAY, SUSPENSION (ML) NASAL DAILY
Qty: 16 G | Refills: 0 | Status: SHIPPED | OUTPATIENT
Start: 2025-05-21

## 2025-05-21 NOTE — TELEPHONE ENCOUNTER
fluticasone propionate 50 MCG/ACT Nasal Suspension   Last time medication was refilled 02/24/2025  Last office visit  02/24/2025  Next office visit due/scheduled No future appointments.    Medication failed protocol.      mesalamine 1.2 g Oral Tab EC   Last time medication was refilled 02/18/2025  Last office visit  02/24/2025  Next office visit due/scheduled No future appointments.    Medication not on protocol.

## 2025-05-28 DIAGNOSIS — E78.00 ELEVATED LDL CHOLESTEROL LEVEL: ICD-10-CM

## 2025-05-28 DIAGNOSIS — E03.9 HYPOTHYROIDISM, ACQUIRED: ICD-10-CM

## 2025-05-28 RX ORDER — ROSUVASTATIN CALCIUM 20 MG/1
20 TABLET, COATED ORAL NIGHTLY
Qty: 90 TABLET | Refills: 0 | Status: SHIPPED | OUTPATIENT
Start: 2025-05-28

## 2025-05-28 RX ORDER — LEVOTHYROXINE SODIUM 25 UG/1
25 TABLET ORAL
Qty: 90 TABLET | Refills: 0 | Status: SHIPPED | OUTPATIENT
Start: 2025-05-28

## 2025-05-29 NOTE — TELEPHONE ENCOUNTER
Last time medication was refilled: 2/19/25  Next office visit due/scheduled: no apt  Last office visit: 2/24/25  Last Labs: 2/19/25

## 2025-06-10 DIAGNOSIS — G44.219 EPISODIC TENSION-TYPE HEADACHE, NOT INTRACTABLE: ICD-10-CM

## 2025-06-10 DIAGNOSIS — R42 DIZZINESS: ICD-10-CM

## 2025-06-10 DIAGNOSIS — H65.193 ACUTE EFFUSION OF BOTH MIDDLE EARS: ICD-10-CM

## 2025-06-10 DIAGNOSIS — H93.11 TINNITUS OF RIGHT EAR: ICD-10-CM

## 2025-06-10 RX ORDER — PREDNISONE 20 MG/1
40 TABLET ORAL DAILY
Qty: 10 TABLET | Refills: 0 | OUTPATIENT
Start: 2025-06-10

## 2025-06-24 NOTE — PROGRESS NOTES
Nurys Oneill is a 64 year old female.  HPI:   HPI   History of Present Illness  Nurys Oneill is a 64 year old female who presents with right shoulder pain and tingling in her hand.    She has experienced these symptoms for three weeks, coinciding with an increase in her weightlifting routine by ten pounds. The pain originates from her shoulder and radiates to her hand, with tingling extending up to her neck. The tingling varies in intensity and worsens with certain arm positions during sleep. She uses a therapeutic pillow to support her neck, as regular pillows increase her discomfort.     Current Medications[1]   Past Medical History[2]   Social History:  Short Social Hx on File[3]     REVIEW OF SYSTEMS:   Review of Systems   Constitutional: Negative.    HENT: Negative.     Respiratory: Negative.     Cardiovascular: Negative.    Musculoskeletal:  Positive for myalgias.   Neurological:  Positive for numbness. Negative for weakness.   Psychiatric/Behavioral: Negative.           EXAM:   /72   Pulse 73   Temp 97.7 °F (36.5 °C)   Resp 16   Ht 4' 11\" (1.499 m)   Wt 144 lb (65.3 kg)   SpO2 96%   BMI 29.08 kg/m²   Physical Exam  Vitals and nursing note reviewed.   Constitutional:       Appearance: Normal appearance. She is normal weight.   Cardiovascular:      Rate and Rhythm: Normal rate and regular rhythm.      Pulses: Normal pulses.      Heart sounds: Normal heart sounds.   Pulmonary:      Effort: Pulmonary effort is normal.      Breath sounds: Normal breath sounds.   Musculoskeletal:         General: Tenderness present.      Right shoulder: Normal.      Right elbow: Normal.      Right wrist: Normal.      Cervical back: Normal.        Back:       Comments: Tender area with palpation   Skin:     General: Skin is warm and dry.      Capillary Refill: Capillary refill takes less than 2 seconds.   Neurological:      General: No focal deficit present.      Mental Status: She is alert and oriented to  person, place, and time. Mental status is at baseline.   Psychiatric:         Mood and Affect: Mood normal.         Behavior: Behavior normal.         Thought Content: Thought content normal.         Judgment: Judgment normal.          ASSESSMENT AND PLAN:   Diagnoses and all orders for this visit:    Strain of right trapezius muscle, initial encounter  -     methylPREDNISolone (MEDROL) 4 MG Oral Tablet Therapy Pack; As directed.  -     cyclobenzaprine 10 MG Oral Tab; Take 1 tablet (10 mg total) by mouth nightly as needed.      Requested Prescriptions     Signed Prescriptions Disp Refills    methylPREDNISolone (MEDROL) 4 MG Oral Tablet Therapy Pack 1 each 0     Sig: As directed.    cyclobenzaprine 10 MG Oral Tab 20 tablet 0     Sig: Take 1 tablet (10 mg total) by mouth nightly as needed.         The patient indicates understanding of these issues and agrees to the plan.  The patient is asked to return if symptoms persist or worsen.          [1]   Current Outpatient Medications   Medication Sig Dispense Refill    methylPREDNISolone (MEDROL) 4 MG Oral Tablet Therapy Pack As directed. 1 each 0    cyclobenzaprine 10 MG Oral Tab Take 1 tablet (10 mg total) by mouth nightly as needed. 20 tablet 0    ROSUVASTATIN 20 MG Oral Tab TAKE 1 TABLET(20 MG) BY MOUTH EVERY NIGHT 90 tablet 0    LEVOTHYROXINE 25 MCG Oral Tab TAKE 1 TABLET(25 MCG) BY MOUTH BEFORE BREAKFAST 90 tablet 0    MESALAMINE 1.2 g Oral Tab EC TAKE 2 TABLETS(2.4 GRAMS) BY MOUTH DAILY 60 tablet 2    Fluocinolone Acetonide Scalp 0.01 % External Oil Apply 1 Application topically daily. 1 each 0    clobetasol 0.05 % External Cream Apply 2x a day for 2 weeks, then take a 2 week break. Repeat as necessary.      Fluocinonide 0.05 % External Solution       Ascorbic Acid (VITAMIN C) 100 MG Oral Chew Tab As Directed.      montelukast 10 MG Oral Tab Take 1 tablet (10 mg total) by mouth daily. 90 tablet 0    Meloxicam 15 MG Oral Tab Take 1 tablet (15 mg total) by mouth daily  as needed for Pain. 30 tablet 0    fluticasone propionate 50 MCG/ACT Nasal Suspension 2 sprays by Each Nare route daily. 3 each 0    olopatadine 0.1 % Ophthalmic Solution Apply 1 drop to eye 2 (two) times daily. INSTILL 1 DROP IN RIGHT EYE TWICE DAILY 5 mL 0    Multiple Vitamins-Minerals (MULTIVITAMIN ADULT OR) every other day.        FLUTICASONE PROPIONATE 50 MCG/ACT Nasal Suspension SHAKE LIQUID AND USE 2 SPRAYS IN EACH NOSTRIL DAILY 16 g 0   [2]   Past Medical History:   Allergic rhinitis    Visual impairment    glasses   [3]   Social History  Socioeconomic History    Marital status:    Tobacco Use    Smoking status: Never    Smokeless tobacco: Never    Tobacco comments:     non-smoker   Vaping Use    Vaping status: Never Used   Substance and Sexual Activity    Alcohol use: Yes     Comment: occasionally    Drug use: No    Sexual activity: Yes     Partners: Male

## 2025-06-25 ENCOUNTER — OFFICE VISIT (OUTPATIENT)
Dept: INTERNAL MEDICINE CLINIC | Facility: CLINIC | Age: 65
End: 2025-06-25
Payer: COMMERCIAL

## 2025-06-25 VITALS
SYSTOLIC BLOOD PRESSURE: 118 MMHG | HEIGHT: 59 IN | RESPIRATION RATE: 16 BRPM | DIASTOLIC BLOOD PRESSURE: 72 MMHG | BODY MASS INDEX: 29.03 KG/M2 | WEIGHT: 144 LBS | TEMPERATURE: 98 F | OXYGEN SATURATION: 96 % | HEART RATE: 73 BPM

## 2025-06-25 DIAGNOSIS — S46.811A STRAIN OF RIGHT TRAPEZIUS MUSCLE, INITIAL ENCOUNTER: Primary | ICD-10-CM

## 2025-06-25 PROCEDURE — 99213 OFFICE O/P EST LOW 20 MIN: CPT

## 2025-06-25 RX ORDER — CYCLOBENZAPRINE HCL 10 MG
10 TABLET ORAL NIGHTLY PRN
Qty: 20 TABLET | Refills: 0 | Status: SHIPPED | OUTPATIENT
Start: 2025-06-25

## 2025-06-25 RX ORDER — METHYLPREDNISOLONE 4 MG/1
TABLET ORAL
Qty: 1 EACH | Refills: 0 | Status: SHIPPED | OUTPATIENT
Start: 2025-06-25

## 2025-06-25 NOTE — PROGRESS NOTES
The following individual(s) verbally consented to be recorded using ambient AI listening technology and understand that they can each withdraw their consent to this listening technology at any point by asking the clinician to turn off or pause the recording:    Patient name: Nurys Oneill  Additional names:

## 2025-07-22 DIAGNOSIS — K52.9 COLITIS: ICD-10-CM

## 2025-07-22 RX ORDER — MESALAMINE 1.2 G/1
2.4 TABLET, DELAYED RELEASE ORAL DAILY
Qty: 60 TABLET | Refills: 0 | Status: SHIPPED | OUTPATIENT
Start: 2025-07-22

## 2025-07-22 NOTE — TELEPHONE ENCOUNTER
Medication requested: MESALAMINE 1.2 g Oral Tab EC       Is patient requesting 30 or 90 day supply:  60 tablet    Pharmacy name/location:  [facility][id number] - [place], [address] [place]

## 2025-07-22 NOTE — TELEPHONE ENCOUNTER
Last time medication was refilled 05/21/2025  Last office visit  06/25/2025  Next office visit due/scheduled No future appointments.    Medication not on protocol.

## 2025-07-30 ENCOUNTER — TELEPHONE (OUTPATIENT)
Dept: INTERNAL MEDICINE CLINIC | Facility: CLINIC | Age: 65
End: 2025-07-30

## 2025-08-06 ENCOUNTER — OFFICE VISIT (OUTPATIENT)
Dept: INTERNAL MEDICINE CLINIC | Facility: CLINIC | Age: 65
End: 2025-08-06

## 2025-08-06 ENCOUNTER — LAB ENCOUNTER (OUTPATIENT)
Dept: LAB | Age: 65
End: 2025-08-06

## 2025-08-06 VITALS
DIASTOLIC BLOOD PRESSURE: 80 MMHG | HEART RATE: 71 BPM | OXYGEN SATURATION: 97 % | HEIGHT: 59 IN | SYSTOLIC BLOOD PRESSURE: 120 MMHG | BODY MASS INDEX: 30.16 KG/M2 | RESPIRATION RATE: 18 BRPM | WEIGHT: 149.63 LBS

## 2025-08-06 DIAGNOSIS — E78.2 MIXED HYPERLIPIDEMIA: ICD-10-CM

## 2025-08-06 DIAGNOSIS — R71.8 ELEVATED MCV: ICD-10-CM

## 2025-08-06 DIAGNOSIS — N89.8 VAGINAL DISCHARGE: ICD-10-CM

## 2025-08-06 DIAGNOSIS — E03.9 HYPOTHYROIDISM, ACQUIRED: ICD-10-CM

## 2025-08-06 DIAGNOSIS — Z12.4 SCREENING FOR CERVICAL CANCER: Primary | ICD-10-CM

## 2025-08-06 DIAGNOSIS — E78.00 ELEVATED LDL CHOLESTEROL LEVEL: ICD-10-CM

## 2025-08-06 LAB
ALBUMIN SERPL-MCNC: 4.6 G/DL (ref 3.2–4.8)
ALBUMIN/GLOB SERPL: 1.7 (ref 1–2)
ALP LIVER SERPL-CCNC: 71 U/L (ref 50–130)
ALT SERPL-CCNC: 21 U/L (ref 10–49)
ANION GAP SERPL CALC-SCNC: 10 MMOL/L (ref 0–18)
AST SERPL-CCNC: 29 U/L (ref ?–34)
BILIRUB SERPL-MCNC: 0.7 MG/DL (ref 0.2–1.1)
BUN BLD-MCNC: 12 MG/DL (ref 9–23)
CALCIUM BLD-MCNC: 9.7 MG/DL (ref 8.7–10.6)
CHLORIDE SERPL-SCNC: 108 MMOL/L (ref 98–112)
CHOLEST SERPL-MCNC: 278 MG/DL (ref ?–200)
CO2 SERPL-SCNC: 25 MMOL/L (ref 21–32)
CREAT BLD-MCNC: 1.07 MG/DL (ref 0.55–1.02)
EGFRCR SERPLBLD CKD-EPI 2021: 59 ML/MIN/1.73M2 (ref 60–?)
FASTING PATIENT LIPID ANSWER: YES
FASTING STATUS PATIENT QL REPORTED: YES
FOLATE SERPL-MCNC: 9.9 NG/ML (ref 5.4–?)
GLOBULIN PLAS-MCNC: 2.7 G/DL (ref 2–3.5)
GLUCOSE BLD-MCNC: 89 MG/DL (ref 70–99)
HDLC SERPL-MCNC: 72 MG/DL (ref 40–59)
LDLC SERPL CALC-MCNC: 189 MG/DL (ref ?–100)
NONHDLC SERPL-MCNC: 206 MG/DL (ref ?–130)
OSMOLALITY SERPL CALC.SUM OF ELEC: 295 MOSM/KG (ref 275–295)
POTASSIUM SERPL-SCNC: 4 MMOL/L (ref 3.5–5.1)
PROT SERPL-MCNC: 7.3 G/DL (ref 5.7–8.2)
SODIUM SERPL-SCNC: 143 MMOL/L (ref 136–145)
TRIGL SERPL-MCNC: 99 MG/DL (ref 30–149)
TSI SER-ACNC: 4.64 UIU/ML (ref 0.55–4.78)
VIT B12 SERPL-MCNC: 311 PG/ML (ref 211–911)
VLDLC SERPL CALC-MCNC: 20 MG/DL (ref 0–30)

## 2025-08-06 PROCEDURE — 99214 OFFICE O/P EST MOD 30 MIN: CPT

## 2025-08-06 PROCEDURE — 82607 VITAMIN B-12: CPT

## 2025-08-06 PROCEDURE — 36415 COLL VENOUS BLD VENIPUNCTURE: CPT

## 2025-08-06 PROCEDURE — 87624 HPV HI-RISK TYP POOLED RSLT: CPT

## 2025-08-06 PROCEDURE — 82746 ASSAY OF FOLIC ACID SERUM: CPT

## 2025-08-06 PROCEDURE — 84443 ASSAY THYROID STIM HORMONE: CPT

## 2025-08-06 PROCEDURE — 80053 COMPREHEN METABOLIC PANEL: CPT

## 2025-08-06 PROCEDURE — 80061 LIPID PANEL: CPT

## 2025-08-06 RX ORDER — NAPROXEN 500 MG/1
500 TABLET ORAL 2 TIMES DAILY WITH MEALS
COMMUNITY
Start: 2025-01-10

## 2025-08-11 LAB — HPV E6+E7 MRNA CVX QL NAA+PROBE: NEGATIVE

## 2025-08-12 LAB
LAST PAP RESULT: NORMAL
PAP HISTORY (OTHER THAN LAST PAP): NORMAL

## 2025-08-19 DIAGNOSIS — K52.9 COLITIS: ICD-10-CM

## 2025-08-19 RX ORDER — MESALAMINE 1.2 G/1
TABLET, DELAYED RELEASE ORAL
Qty: 60 TABLET | Refills: 0 | Status: SHIPPED | OUTPATIENT
Start: 2025-08-19

## (undated) DIAGNOSIS — E03.9 HYPOTHYROIDISM, UNSPECIFIED TYPE: ICD-10-CM

## (undated) DIAGNOSIS — K52.9 COLITIS: ICD-10-CM

## (undated) DIAGNOSIS — J30.2 SEASONAL ALLERGIES: Primary | ICD-10-CM

## (undated) DIAGNOSIS — Z01.84 IMMUNITY STATUS TESTING: Primary | ICD-10-CM

## (undated) DEVICE — 1200CC GUARDIAN II: Brand: GUARDIAN

## (undated) DEVICE — STERILE POLYISOPRENE POWDER-FREE SURGICAL GLOVES: Brand: PROTEXIS

## (undated) DEVICE — Device: Brand: DEFENDO AIR/WATER/SUCTION AND BIOPSY VALVE

## (undated) DEVICE — FILTERLINE NASAL ADULT O2/CO2

## (undated) DEVICE — ENDOSCOPY PACK - LOWER: Brand: MEDLINE INDUSTRIES, INC.

## (undated) DEVICE — GLOVE SURG SENSICARE SZ 7

## (undated) DEVICE — 3M™ RED DOT™ MONITORING ELECTRODE WITH FOAM TAPE AND STICKY GEL, 50/BAG, 20/CASE, 72/PLT 2570: Brand: RED DOT™

## (undated) DEVICE — FORCEP RADIAL JAW 4

## (undated) DEVICE — FORCEP BIOPSY RJ4 LG CAP W/ND

## (undated) NOTE — LETTER
[address], [address] [facility] [address]  [phone]                3/3/2020        [id number] [id number]      Dear Clive Landa.     To help us provide the highes

## (undated) NOTE — LETTER
03/14/19        Gisele Jacques  [address] [address]      Dear Cony Carrillo,    [address] [facility] records indicate that you have outstanding testing that was ordered for you and has not yet been completed:        US ABDOMEN COMPLETE (CPT=76700)    To provide yo

## (undated) NOTE — LETTER
02/14/19        Nurys Hollins [phone]      Dear Varghese Payne,    [address] [facility] records indicate that you have outstanding  testing that was ordered for you and has not yet been completed:         US ABDOMEN COMPLETE (CPT=76700)    To provide

## (undated) NOTE — LETTER
[address] [facility] [address], [address]  [employer] [address]            Dear Patient,   According to our records your are due for fasting

## (undated) NOTE — LETTER
21    Patient: Bulmaro Lieberman  : 1960 Visit date: 2021    Dear    No Recipients    Thank you for referring Bulmaro Lieberman to my practice. Please find my assessment and plan below.     Assessment   Screening colonoscopy and patient

## (undated) NOTE — LETTER
04/15/19        [address]aleb Bergman  [address] [address]      Dear Lillian Moreno,    [address] [facility] records indicate that you have outstanding testing that was ordered for you and has not yet been completed:        US ABDOMEN COMPLETE (CPT=76700)    To eric

## (undated) NOTE — LETTER
[address], [address] [facility] [address] [facility] [address]  [phone]                7/22/2019        [address]ka [address]hta [phone]      Dear Ericka Guthrie.     To help us provide the highe

## (undated) NOTE — LETTER
Patient Name: Harley Redd  YOB: 1960          MRN number:  [id number]  Date:  6/13/2019  Referring Physician:  Galina Chance            LOWER EXTREMITY EVALUATION:   Referring Physician: Dr. Fransisca Sotelo  Diagnosis: Left knee pain, unspecified positive [place] test but all other special tests were negative. Pt's abnormal gait pattern is also likely leading to further dysfunction in L knee and resultant pain.  Pt would benefit from skilled physical therapy to address impairments and return to nor Based on clinical rationale and outcome measures, this evaluation involved Low Complexity   PLAN OF CARE:    Goals: (to be met in 8 visits)  · Pt will improve quad flexibility to improve ability to perform functional tasks with decrease strain on the knee.